# Patient Record
Sex: FEMALE | Race: WHITE
[De-identification: names, ages, dates, MRNs, and addresses within clinical notes are randomized per-mention and may not be internally consistent; named-entity substitution may affect disease eponyms.]

---

## 2017-06-04 ENCOUNTER — HOSPITAL ENCOUNTER (INPATIENT)
Dept: HOSPITAL 45 - C.EDB | Age: 82
LOS: 3 days | Discharge: NURSING, REHAB OR LONG-TERM CARE | DRG: 512 | End: 2017-06-07
Attending: HOSPITALIST | Admitting: HOSPITALIST
Payer: COMMERCIAL

## 2017-06-04 VITALS
HEART RATE: 79 BPM | SYSTOLIC BLOOD PRESSURE: 157 MMHG | DIASTOLIC BLOOD PRESSURE: 76 MMHG | TEMPERATURE: 98.96 F | OXYGEN SATURATION: 98 %

## 2017-06-04 VITALS
BODY MASS INDEX: 17.04 KG/M2 | HEIGHT: 62 IN | WEIGHT: 92.59 LBS | WEIGHT: 92.59 LBS | HEIGHT: 62 IN | BODY MASS INDEX: 17.04 KG/M2

## 2017-06-04 VITALS
HEART RATE: 82 BPM | TEMPERATURE: 98.96 F | OXYGEN SATURATION: 96 % | DIASTOLIC BLOOD PRESSURE: 66 MMHG | SYSTOLIC BLOOD PRESSURE: 116 MMHG

## 2017-06-04 VITALS
DIASTOLIC BLOOD PRESSURE: 67 MMHG | HEART RATE: 71 BPM | SYSTOLIC BLOOD PRESSURE: 118 MMHG | OXYGEN SATURATION: 97 % | TEMPERATURE: 98.78 F

## 2017-06-04 VITALS — OXYGEN SATURATION: 98 %
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
## 2017-06-04 DIAGNOSIS — Z88.0: ICD-10-CM

## 2017-06-04 DIAGNOSIS — M84.48XA: ICD-10-CM

## 2017-06-04 DIAGNOSIS — M80.032A: Primary | ICD-10-CM

## 2017-06-04 DIAGNOSIS — Z96.641: ICD-10-CM

## 2017-06-04 DIAGNOSIS — I10: ICD-10-CM
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
## 2017-06-04 DIAGNOSIS — M19.90: ICD-10-CM

## 2017-06-04 DIAGNOSIS — G40.909: ICD-10-CM

## 2017-06-04 DIAGNOSIS — Z91.040: ICD-10-CM

## 2017-06-04 LAB
ANION GAP SERPL CALC-SCNC: 7 MMOL/L (ref 3–11)
APPEARANCE UR: (no result)
BASOPHILS # BLD: 0.02 K/UL (ref 0–0.2)
BASOPHILS NFR BLD: 0.2 %
BILIRUB UR-MCNC: (no result) MG/DL
BUN SERPL-MCNC: 13 MG/DL (ref 7–18)
BUN/CREAT SERPL: 19.3 (ref 10–20)
CALCIUM SERPL-MCNC: 8.8 MG/DL (ref 8.5–10.1)
CHLORIDE SERPL-SCNC: 99 MMOL/L (ref 98–107)
CO2 SERPL-SCNC: 29 MMOL/L (ref 21–32)
COLOR UR: YELLOW
COMPLETE: YES
CREAT CL PREDICTED SERPL C-G-VRATE: 39.6 ML/MIN
CREAT SERPL-MCNC: 0.69 MG/DL (ref 0.6–1.2)
EOSINOPHIL NFR BLD AUTO: 217 K/UL (ref 130–400)
GLUCOSE SERPL-MCNC: 108 MG/DL (ref 70–99)
HCT VFR BLD CALC: 36 % (ref 37–47)
IG%: 0.4 %
IMM GRANULOCYTES NFR BLD AUTO: 7.5 %
INR PPP: 1 (ref 0.9–1.1)
LYMPHOCYTES # BLD: 0.76 K/UL (ref 1.2–3.4)
MANUAL MICROSCOPIC REQUIRED?: NO
MCH RBC QN AUTO: 31.5 PG (ref 25–34)
MCHC RBC AUTO-ENTMCNC: 33.1 G/DL (ref 32–36)
MCV RBC AUTO: 95.2 FL (ref 80–100)
MONOCYTES NFR BLD: 9.7 %
NEUTROPHILS # BLD AUTO: 0.3 %
NEUTROPHILS NFR BLD AUTO: 81.9 %
NITRITE UR QL STRIP: (no result)
PH UR STRIP: 8 [PH] (ref 4.5–7.5)
PMV BLD AUTO: 8.1 FL (ref 7.4–10.4)
POTASSIUM SERPL-SCNC: 4.7 MMOL/L (ref 3.5–5.1)
PROTHROMBIN TIME: 10.7 SECONDS (ref 9–12)
RBC # BLD AUTO: 3.78 M/UL (ref 4.2–5.4)
REVIEW REQ?: NO
SODIUM SERPL-SCNC: 135 MMOL/L (ref 136–145)
SP GR UR STRIP: 1.02 (ref 1–1.03)
URINE BILL WITH OR WITHOUT MIC: (no result)
URINE EPITHELIAL CELL AUTO: >30 /LPF (ref 0–5)
UROBILINOGEN UR-MCNC: (no result) MG/DL
WBC # BLD AUTO: 10.07 K/UL (ref 4.8–10.8)

## 2017-06-04 RX ADMIN — MORPHINE SULFATE PRN MG: 2 INJECTION, SOLUTION INTRAMUSCULAR; INTRAVENOUS at 23:56

## 2017-06-04 RX ADMIN — HEPARIN SODIUM SCH UNIT: 10000 INJECTION, SOLUTION INTRAVENOUS; SUBCUTANEOUS at 21:04

## 2017-06-04 RX ADMIN — MORPHINE SULFATE PRN MG: 2 INJECTION, SOLUTION INTRAMUSCULAR; INTRAVENOUS at 18:40

## 2017-06-04 RX ADMIN — Medication SCH TAB: at 21:01

## 2017-06-04 RX ADMIN — Medication SCH MG: at 21:01

## 2017-06-04 RX ADMIN — CARBAMAZEPINE SCH MG: 200 TABLET, EXTENDED RELEASE ORAL at 21:02

## 2017-06-04 NOTE — EMERGENCY ROOM VISIT NOTE
ED Visit Note


First contact with patient:  12:51


Staff note:


I have reviewed the Patients chart and have discussed this case with my PA. I 

generally agree with the ED note and findings.

## 2017-06-04 NOTE — HISTORY AND PHYSICAL
History & Physical


Date & Time of Service:


Jun 4, 2017 at 16:56


Chief Complaint:


Fall/Elbow Deformity


Primary Care Physician:


Anthony Mcclain M.D.


History of Present Illness


Source:  patient, caregiver, clinic records, hospital records


86 yo F with OA presents with L elbow pain and swelling after rolling off of 

her couch onto her floor.  Workup in the ER reveals a L exbow fracture and 

several L rib fractures.  She reports some pain in those areas, but states that 

it is controlled.  She has had surgeries in the past with no problems 

tolerating anesthesia.  She denies a history of blood clot.  She denies any 

history of chest pain or shortness of breath in the past 6 months and denies 

any history of heart or lung disease.  At baseline, she is very functional as 

she lives alone.  She states she takes care of everything for herself and can 

climb a flight of steps without any issues.  She does has a h/o R hip 

replacement and reports some chronic pain in that joint for which she takes 

Fentanyl successfully.  She reports h/o epilepsy but denies any h/o seizure in 

years.  Dr Cormier was contacted by the ER staff and is aware of the patient.  

He asked for Medicine team to admit and he will see the patient in consult.





Past Medical/Surgical History


Medical Problems:


(1) HTN (hypertension)


Status: Chronic  





(2) Osteoarthritis


Status: Chronic  





(3) Osteoporosis


Status: Chronic  





(4) Seizure disorder


Status: Chronic  





Surgical Problems:


(1) Status post hip replacement


Status: Chronic  





(2) Status post lumbar laminectomy


Status: Chronic  





(3) Status post tonsillectomy


Status: Chronic  














Family History





Hypertension





Social History


Smoking Status:  Never Smoker


Smokeless Tobacco Use:  No


Alcohol Use:  none


Drug Use:  none


Marital Status:  


Housing status:  lives alone


Occupational Status:  retired





Immunizations


History of Influenza Vaccine:  No


Influenza Vaccine Date:  Oct 2, 2014


History of Tetanus Vaccine?:  Yes


Tetanus Immunization Date:  Dec 17, 2012


History of Pneumococcal:  Yes


Pneumococcal Date:  Nov 19, 2015


History of Hepatitis B Vaccine:  No





Multi-Drug Resistant Organisms


History of MDRO:  No





Allergies


Coded Allergies:  


     ACE Inhibitors (Verified  Allergy, Intermediate, COUGH, 6/4/17)


     Adhesives (Verified  Allergy, Intermediate, SEVERE BLISTERS, 6/4/17)


     Latex1 -Allergic Contact Dermititis (Verified  Allergy, Mild, RASH, 

BLISTERS, 6/4/17)


     Alendronate (Verified  Allergy, Unknown, Unknown., 6/4/17)


 PT list.


     Penicillins (Verified  Allergy, Unknown, RASH, 6/4/17)


     Ranitidine (Verified  Allergy, Unknown, Unknown., 6/4/17)


 PT list.


     Tetracycline (Verified  Allergy, Unknown, UNKNOWN, 6/4/17)


     Topiramate (Verified  Allergy, Unknown, UNKNOWN, 6/4/17)


     Valproic Acid and Related (Verified  Adverse Reaction, Intermediate, 

DIPLOPIA;SEVERE WT LOSS, 6/4/17)


     Beta Adrenergic Blockers (Verified  Adverse Reaction, Mild, FATIGUE, 6/4/17

)


     Codeine (Verified  Adverse Reaction, Mild, NAUSEA, 6/4/17)


     Nifedipine (Verified  Adverse Reaction, Mild, ABD BLOATING, 6/4/17)





Home Medications


Scheduled


Calcium Carbonate-Vitamin D (Calcium 600 + D), 1 TAB PO BID


Carbamazepine (Carbatrol Er), 200 MG PO BID


Ergocalciferol (Drisdol), 1 CAP PO MONTHLY


Fentanyl (Fentanyl), 12 MCG TOP Q3D


Losartan Potassium (Cozaar), 1 TAB PO DAILY


Multiple Vitamins W/ Minerals (Preservision Areds), 2 CAP PO DAILY


Potassium Chloride (K-Tabs), 10 MEQ PO DAILY


Tolterodine Tartrate (Tolterodine Tartrate), 2 MG PO BID


Zonisamide (Zonegran), 100 MG PO BID





Scheduled PRN


Hydrocodone/Acetaminophen (Norco 10/325 Tab), 1 TAB PO Q4H PRN for Pain





Review of Systems


Ten systems were reviewed and negative except as indicated in HPI.





Physical Exam


Vital Signs











  Date Time  Temp Pulse Resp B/P (MAP) Pulse Ox O2 Delivery O2 Flow Rate FiO2


 


6/4/17 16:13     98 Room Air  


 


6/4/17 14:52  85 18 156/93 98 Room Air  


 


6/4/17 13:47  121      


 


6/4/17 12:57 36.9 85 18 163/73 95 Room Air  








GEN: elderly, frail, in no acute distress, alert and appropriate


HEENT: NC/AT, PERRL, normal sclerae, MMM, pharynx non-acute, no LAD


CARDIO: reg rate, S1/2 heard without m/g/r


LUNGS: CTA bilaterally, no crackles, rales or wheezes, good diaphragmatic 

excursion


ABD: soft, non-tender, non-distended, no rebound or guarding, +BS


EXTREMITY: 2+ pulses throughout, L elbow is swollen and erythematous-localized 

over olecranon process, cannot fully extend, sensation intact in distal 

extremity, moves all fingers without issue. 


NEURO: CN 2-12 grossly intact, sensation intact throughout


MUSC: limited movement of L arm as above, some difficulty moving R leg because 

of R hip pain, otherwise strength appears 5/5 throughout. 


SKIN:  warm and dry and changes as above.





Diagnostics


Laboratory Results





Results Past 24 Hours








Test


  6/4/17


13:55 6/4/17


16:18 Range/Units


 


 


White Blood Count 10.07  4.8-10.8  K/uL


 


Red Blood Count 3.78  4.2-5.4  M/uL


 


Hemoglobin 11.9  12.0-16.0  g/dL


 


Hematocrit 36.0  37-47  %


 


Mean Corpuscular Volume 95.2    fL


 


Mean Corpuscular Hemoglobin 31.5  25-34  pg


 


Mean Corpuscular Hemoglobin


Concent 33.1


  


  32-36  g/dl


 


 


Platelet Count 217  130-400  K/uL


 


Mean Platelet Volume 8.1  7.4-10.4  fL


 


Neutrophils (%) (Auto) 81.9   %


 


Lymphocytes (%) (Auto) 7.5   %


 


Monocytes (%) (Auto) 9.7   %


 


Eosinophils (%) (Auto) 0.3   %


 


Basophils (%) (Auto) 0.2   %


 


Neutrophils # (Auto) 8.24  1.4-6.5  K/uL


 


Lymphocytes # (Auto) 0.76  1.2-3.4  K/uL


 


Monocytes # (Auto) 0.98  0.11-0.59  K/uL


 


Eosinophils # (Auto) 0.03  0-0.5  K/uL


 


Basophils # (Auto) 0.02  0-0.2  K/uL


 


RDW Standard Deviation 46.8  36.4-46.3  fL


 


RDW Coefficient of Variation 13.4  11.5-14.5  %


 


Immature Granulocyte % (Auto) 0.4   %


 


Immature Granulocyte # (Auto) 0.04  0.00-0.02  K/uL


 


Sodium Level 135  136-145  mmol/L


 


Potassium Level 4.7  3.5-5.1  mmol/L


 


Chloride Level 99    mmol/L


 


Carbon Dioxide Level 29  21-32  mmol/L


 


Anion Gap 7.0  3-11  mmol/L


 


Blood Urea Nitrogen 13  7-18  mg/dl


 


Creatinine


  0.69


  


  0.60-1.20


mg/dl


 


Est Creatinine Clear Calc


Drug Dose 39.6


  


   ml/min


 


 


Estimated GFR (


American) 92.0


  


   


 


 


Estimated GFR (Non-


American 79.4


  


   


 


 


BUN/Creatinine Ratio 19.3  10-20  


 


Random Glucose 108  70-99  mg/dl


 


Calcium Level 8.8  8.5-10.1  mg/dl











Diagnostic Radiology


LEFT ELBOW 2 VIEWS





CLINICAL HISTORY: Fall. Elbow deformity.





FINDINGS: Frontal and lateral views of the left elbow are obtained. No prior


studies are available for comparison at the time of dictation. The skeletal


structures are osteopenic. There is a distracted fracture through the olecranon


process of the ulna. The fragments are distracted by at least 7 mm. There is a


large associated joint effusion and marked soft tissue edema around the elbow.


No additional fracture is seen. Enthesophytes arise from the humeral


epicondyles. Degenerative spurring is seen along the medial aspect of the joint


space.





IMPRESSION:





1. There is a distracted fracture of the olecranon process with associated joint


effusion and soft tissue edema.





2. No additional fracture is seen.


--------------------------------------------------------------------------------

-------------------------


RIGHT HIP 2 VIEWS





CLINICAL HISTORY: Fall with right hip pain.





FINDINGS: AP and frog-leg views of the right hip are correlated with pelvic


radiograph dated 11/10/2011. The skeletal structures are osteopenic. A right hip


arthroplasty is in near-anatomic alignment. No acute fracture is seen. No


periprosthetic lucency is identified. Sclerotic change is noted in the right


sacroiliac joint. The overlying soft tissues are within normal limits. Pelvic


calcifications are unchanged and may be related to uterine fibroids.





IMPRESSION:





1. There is no radiographic evidence of right hip fracture.





2. A right hip arthroplasty is in near-anatomic alignment.


--------------------------------------------------------------------------------

-----------


AP CHEST WITH LEFT-SIDED RIB SERIES





CLINICAL HISTORY:  Fall. Left rib pain.





FINDINGS: An AP upright chest radiograph with 3 additional views may left-sided


rib series is compared to study dated 10/5/2014. The heart is top normal for


projection and there is atherosclerotic calcification of the thoracic aorta.


Chronic interstitial thickening is unchanged. No airspace consolidation, pleural


effusion, or pneumothorax is seen. The skeletal structures are osteopenic. There


are numerous healed left-sided rib fractures. There are acute and nondistracted


left anterolateral 7th through 10th rib fractures seen on the rib series.


Degenerative change and moderate scoliosis is noted in the thoracic spine.


Fusion hardware is partially imaged in the lumbar spine.





IMPRESSION:





1. The lungs are clear and there is no pneumothorax.





2.There are acute and nondistracted left anterolateral 7th through 10th rib


fractures seen on the rib series.





EKG


SR 69.





Impression


Assessment and Plan


86 yo F with osteoporosis and poss UTI presents with L elbow fracture and 

multiple L rib fractures after traumatic fall from a sitting height 





1.  Fractures-Ortho to see and consider surgery on elbow.  She is OK to proceed 

to surgery without further cardiac workup at this time.  ER staff reports they 

will splint the elbow which has not been done at the time of this exam.  Will 

also allow ice pack to area and control pain with morphine and Norco PRN.  


2.  Epilepsy-remote h/o seizures, controlled well on current meds


3.  HTN-cont Losartan


4.  Osteoporosis


5.  Urge incontinence-hold.  Detrol to avoid unnecessary medication 

interactions and in setting of poss UTI


6.  UTI-chronic incontinence issues.  In setting of recent fall, will treat 

empirically with Rocephin.


7.  OA





DVT proph-heparin SQ


DNR-discussed with she and her guardian at admission


Dispo-to med/surge, pending Ortho evaluation and plans.





Mary Mullins DO


[facility]





Level of Care


Med/Surg





Advanced Directives


Existing Living Will:  Yes


Existing Power of :  Yes ([employer] NEIGHBOR )





Resuscitation Status


DO NOT RESUSCITATE





VTE Prophylaxis


VTE Risk Assessment Done? Y/N:  Yes


Risk Level:  Moderate


Given or contraindicated:  Unfractionated heparin SQ

## 2017-06-04 NOTE — DIAGNOSTIC IMAGING REPORT
AP CHEST WITH LEFT-SIDED RIB SERIES



CLINICAL HISTORY:  Fall. Left rib pain.



FINDINGS: An AP upright chest radiograph with 3 additional views may left-sided

rib series is compared to study dated 10/5/2014. The heart is top normal for

projection and there is atherosclerotic calcification of the thoracic aorta.

Chronic interstitial thickening is unchanged. No airspace consolidation, pleural

effusion, or pneumothorax is seen. The skeletal structures are osteopenic. There

are numerous healed left-sided rib fractures. There are acute and nondistracted

left anterolateral 7th through 10th rib fractures seen on the rib series.

Degenerative change and moderate scoliosis is noted in the thoracic spine.

Fusion hardware is partially imaged in the lumbar spine.



IMPRESSION:



1. The lungs are clear and there is no pneumothorax.



2.There are acute and nondistracted left anterolateral 7th through 10th rib

fractures seen on the rib series.







Electronically signed by:  Fredi Conway M.D.

6/4/2017 3:27 PM



Dictated Date/Time:  6/4/2017 3:24 PM

## 2017-06-04 NOTE — DIAGNOSTIC IMAGING REPORT
LEFT ELBOW 2 VIEWS



CLINICAL HISTORY: Fall. Elbow deformity.



FINDINGS: Frontal and lateral views of the left elbow are obtained. No prior

studies are available for comparison at the time of dictation. The skeletal

structures are osteopenic. There is a distracted fracture through the olecranon

process of the ulna. The fragments are distracted by at least 7 mm. There is a

large associated joint effusion and marked soft tissue edema around the elbow.

No additional fracture is seen. Enthesophytes arise from the humeral

epicondyles. Degenerative spurring is seen along the medial aspect of the joint

space.



IMPRESSION:



1. There is a distracted fracture of the olecranon process with associated joint

effusion and soft tissue edema.



2. No additional fracture is seen.







Electronically signed by:  Fredi Conway M.D.

6/4/2017 3:09 PM



Dictated Date/Time:  6/4/2017 3:07 PM

## 2017-06-04 NOTE — DIAGNOSTIC IMAGING REPORT
RIGHT HIP 2 VIEWS



CLINICAL HISTORY: Fall with right hip pain.



FINDINGS: AP and frog-leg views of the right hip are correlated with pelvic

radiograph dated 11/10/2011. The skeletal structures are osteopenic. A right hip

arthroplasty is in near-anatomic alignment. No acute fracture is seen. No

periprosthetic lucency is identified. Sclerotic change is noted in the right

sacroiliac joint. The overlying soft tissues are within normal limits. Pelvic

calcifications are unchanged and may be related to uterine fibroids.



IMPRESSION:



1. There is no radiographic evidence of right hip fracture.



2. A right hip arthroplasty is in near-anatomic alignment.







Electronically signed by:  Fredi Conway M.D.

6/4/2017 3:22 PM



Dictated Date/Time:  6/4/2017 3:21 PM

## 2017-06-05 VITALS
DIASTOLIC BLOOD PRESSURE: 81 MMHG | SYSTOLIC BLOOD PRESSURE: 174 MMHG | TEMPERATURE: 98.24 F | OXYGEN SATURATION: 96 % | HEART RATE: 83 BPM

## 2017-06-05 VITALS
OXYGEN SATURATION: 98 % | SYSTOLIC BLOOD PRESSURE: 132 MMHG | HEART RATE: 70 BPM | DIASTOLIC BLOOD PRESSURE: 68 MMHG | TEMPERATURE: 97.7 F

## 2017-06-05 VITALS
DIASTOLIC BLOOD PRESSURE: 66 MMHG | OXYGEN SATURATION: 97 % | TEMPERATURE: 98.06 F | HEART RATE: 63 BPM | SYSTOLIC BLOOD PRESSURE: 125 MMHG

## 2017-06-05 VITALS
DIASTOLIC BLOOD PRESSURE: 73 MMHG | SYSTOLIC BLOOD PRESSURE: 156 MMHG | HEART RATE: 63 BPM | OXYGEN SATURATION: 97 % | TEMPERATURE: 97.7 F

## 2017-06-05 VITALS
OXYGEN SATURATION: 95 % | DIASTOLIC BLOOD PRESSURE: 78 MMHG | SYSTOLIC BLOOD PRESSURE: 165 MMHG | TEMPERATURE: 98.06 F | HEART RATE: 81 BPM

## 2017-06-05 VITALS
TEMPERATURE: 97.88 F | HEART RATE: 86 BPM | SYSTOLIC BLOOD PRESSURE: 114 MMHG | OXYGEN SATURATION: 97 % | DIASTOLIC BLOOD PRESSURE: 67 MMHG

## 2017-06-05 LAB
ANION GAP SERPL CALC-SCNC: 9 MMOL/L (ref 3–11)
BUN SERPL-MCNC: 17 MG/DL (ref 7–18)
BUN/CREAT SERPL: 22.9 (ref 10–20)
CALCIUM SERPL-MCNC: 8.9 MG/DL (ref 8.5–10.1)
CHLORIDE SERPL-SCNC: 100 MMOL/L (ref 98–107)
CO2 SERPL-SCNC: 27 MMOL/L (ref 21–32)
CREAT CL PREDICTED SERPL C-G-VRATE: 36.9 ML/MIN
CREAT SERPL-MCNC: 0.74 MG/DL (ref 0.6–1.2)
EOSINOPHIL NFR BLD AUTO: 202 K/UL (ref 130–400)
GLUCOSE SERPL-MCNC: 95 MG/DL (ref 70–99)
HCT VFR BLD CALC: 30.3 % (ref 37–47)
MCH RBC QN AUTO: 31.6 PG (ref 25–34)
MCHC RBC AUTO-ENTMCNC: 33.3 G/DL (ref 32–36)
MCV RBC AUTO: 94.7 FL (ref 80–100)
PMV BLD AUTO: 8.4 FL (ref 7.4–10.4)
POTASSIUM SERPL-SCNC: 4 MMOL/L (ref 3.5–5.1)
RBC # BLD AUTO: 3.2 M/UL (ref 4.2–5.4)
SODIUM SERPL-SCNC: 136 MMOL/L (ref 136–145)
WBC # BLD AUTO: 7.17 K/UL (ref 4.8–10.8)

## 2017-06-05 PROCEDURE — 0PSL04Z REPOSITION LEFT ULNA WITH INTERNAL FIXATION DEVICE, OPEN APPROACH: ICD-10-PCS

## 2017-06-05 RX ADMIN — LOSARTAN POTASSIUM SCH MG: 50 TABLET, FILM COATED ORAL at 09:34

## 2017-06-05 RX ADMIN — CARBAMAZEPINE SCH MG: 200 TABLET, EXTENDED RELEASE ORAL at 21:33

## 2017-06-05 RX ADMIN — CARBAMAZEPINE SCH MG: 200 TABLET, EXTENDED RELEASE ORAL at 09:35

## 2017-06-05 RX ADMIN — Medication SCH TAB: at 09:35

## 2017-06-05 RX ADMIN — ONDANSETRON PRN MG: 2 INJECTION INTRAMUSCULAR; INTRAVENOUS at 20:06

## 2017-06-05 RX ADMIN — MORPHINE SULFATE PRN MG: 2 INJECTION, SOLUTION INTRAMUSCULAR; INTRAVENOUS at 03:57

## 2017-06-05 RX ADMIN — MORPHINE SULFATE PRN MG: 2 INJECTION, SOLUTION INTRAMUSCULAR; INTRAVENOUS at 09:30

## 2017-06-05 RX ADMIN — Medication SCH MG: at 09:36

## 2017-06-05 RX ADMIN — POTASSIUM CHLORIDE SCH MEQ: 750 TABLET, EXTENDED RELEASE ORAL at 09:36

## 2017-06-05 RX ADMIN — ONDANSETRON PRN MG: 2 INJECTION INTRAMUSCULAR; INTRAVENOUS at 09:33

## 2017-06-05 RX ADMIN — ACETAMINOPHEN PRN MG: 325 TABLET ORAL at 01:52

## 2017-06-05 RX ADMIN — MORPHINE SULFATE PRN MG: 2 INJECTION, SOLUTION INTRAMUSCULAR; INTRAVENOUS at 14:08

## 2017-06-05 RX ADMIN — Medication SCH MG: at 21:33

## 2017-06-05 RX ADMIN — Medication SCH TAB: at 09:30

## 2017-06-05 RX ADMIN — HEPARIN SODIUM SCH UNIT: 10000 INJECTION, SOLUTION INTRAVENOUS; SUBCUTANEOUS at 05:30

## 2017-06-05 RX ADMIN — ACETAMINOPHEN PRN MG: 325 TABLET ORAL at 07:47

## 2017-06-05 RX ADMIN — Medication SCH TAB: at 21:33

## 2017-06-05 NOTE — [facility] POST OPERATIVE BRIEF NOTE
Immediate Operative Summary


Operative Date


Jun 5, 2017.





Pre-Operative Diagnosis





Displaced intraarticular left olecranon fracture





Post-Operative Diagnosis





Displaced intraarticular left olecranon fracture





Procedure(s) Performed





Left open reduction internal fixation of olecranon fracture





Surgeon


Dr. Cormier





Assistant Surgeon(s)


MARY Lakhani PA-C





Estimated Blood Loss


15ml





Findings


See Dict





Specimens





none





Drains


None





Anesthesia


GLMA w/ local





Complication(s)


None





Disposition


Recovery Room / PACU

## 2017-06-05 NOTE — DIAGNOSTIC IMAGING REPORT
LEFT ELBOW 2 VIEWS



CLINICAL HISTORY: post-op fracture



COMPARISON: Images 6/4/2017



DISCUSSION: Patient is now casting material. Poor study technically as the

patient could not cooperate. Anatomic alignment status post pinning of the

proximal ulna. There is no evidence for soft tissue swelling.



IMPRESSION: Limited study due to patient's inability to cooperate. Evidence for

pinning of the proximal ulna.







Electronically signed by:  Anthony Calderón M.D.

6/5/2017 7:38 PM



Dictated Date/Time:  6/5/2017 7:36 PM

## 2017-06-05 NOTE — OPERATIVE REPORT
DATE OF OPERATION:  06/05/2017

 

PREOPERATIVE DIAGNOSIS:  Left displaced intra-articular olecranon fracture.

 

POSTOPERATIVE DIAGNOSES:  Same.

 

PROCEDURE PERFORMED:  Open reduction and internal fixation left displaced

intra-articular olecranon fracture with application of tension band wire.

 

SURGEON:  Dr. oCrmier.

 

FIRST ASSISTANT:  Agustín Lakhani PA-C who was present for patient

positioning, sterile prep and drape, management of retractors and

instruments.  He was present through the critical portions of the case

including wound closure, application of sterile dressing and transport of the

patient to recovery.

 

ANESTHESIA:  General LMA with local.

 

SPECIMENS:  None.

 

DRAINS:  None.

 

COMPLICATIONS:  None.

 

BLOOD LOSS:  15 mL.

 

PERTINENT HISTORY:  This is an 85-year-old female who sustained a fall on to

her left elbow suffering a left displaced intra-articular olecranon fracture.

 She was seen in the Emergency Department and admitted to the hospital for

further care and management as well as management of ambulatory dysfunction

and rib fractures as well as intractable pain.  The patient was then

evaluated and cleared for surgery and then scheduled for surgery as

indicated.

 

All potential risks, benefits, complications, alternatives, rehab, potential

for incomplete relief of symptoms, need for further surgery, DVT, PE, death,

persistent pain, swelling, scarring, weakness, neurovascular injury, wound

complications, hardware breakage, bone fracture, nonunion, malunion were

discussed with the patient.  The patient decided to proceed with the

procedure as indicated.

 

DESCRIPTION OF PROCEDURE:  The patient was taken to the operative suite,

placed supine on the operating room table.  I reviewed the consent and

identification of proper operative site, the patient was anesthetized, LMA

was placed.  Tourniquet was placed high on the left upper extremity over cast

padding.  Left upper extremity was then sterilely prepped and draped in usual

fashion, elevated, and exsanguinated with an Esmarch bandage.  Tourniquet

inflated to 250 mmHg.  Next, a 15-blade scalpel was used to make an incision

in the midline of the olecranon extending distally over the ulnar border of

the ulna.  The incision was then carefully deepened through the subcutaneous

tissue.  Meticulous hemostasis was achieved with electrocautery and there was

noted to be significant hematoma from the fall and fracture localized around

the area of the olecranon.  After this was suctioned and irrigated until

clear, the fracture fragments were then clearly identified.  The articular

surface was noted to be intact with displacement of the olecranon in an

intraarticular fracture pattern.  Next, the fracture was then carefully

reduced using a large Hsu reduction forceps and placed in two 0.062 inch K

wires from the olecranon crossing the fracture into the anterior aspect of

the ulna in an extraarticular pinning fashion.  This was performed under live

fluoroscopic assistance.  This was then followed by drilling of a 2.5 mm

drill hole in the more distal ulna for a tension band construct with an 18

gauge surgical wire, which was then passed through the intramedullary canal

of the ulna, passed through a sharp catheter placed adjacent to the olecranon

bone under the triceps insertion followed by tensioning of the tension band

with heavy needle .  After this was completed, excess wire was removed.

 The wire was then bent and placed in a soft tissue pouch adjacent to the

ulna on the opposite side to avoid any irritation of the ulnar nerve.  Next,

the 0.062 inch K wires were then bent, cut and then tamped into the bone and

soft tissue with a small bone tamp and mallet to avoid any hardware

irritation.  Next, the wound was copiously irrigated with sterile normal

saline.  Final x-rays obtained in AP and lateral projections followed by

closure of the fascia and bursa with 2-0 Vicryl.  The dermis was closed using

buried interrupted 3-0 Vicryl, the skin was then closed using nylon sutures. 

The incision was then infiltrated with 0.5% Marcaine plain, as well as the

fracture site for postop pain control.  Next, a gently sterile compressive

dressing was applied, overwrapped with a posterior plaster splint and Ace

wrap.  The elbow was held in neutral flexion and neutral rotation.  The

tourniquet was released.  The patient was awakened and taken to recovery in

stable condition.

 

 

I attest to the content of the Intraoperative Record and any orders documented therein. Any exception
s are noted below.

## 2017-06-05 NOTE — ORTHOPEDIC CONSULTATION REPORT
DATE OF CONSULTATION:  06/05/2017

 

REASON FOR CONSULT:  Left elbow fracture.

 

HISTORY OF PRESENT ILLNESS:  The patient is an 85-year-old white female who

resides by herself in [place] and states that she was sleeping on a couch

and was getting up and was still little sleepy and thought she was stepping

out of the couch where appeared she ended up just rolling off the couch and

injuring herself.  She had pain in her left elbow and also had some

discomfort in her left chest and was brought to the Emergency Room.  She had

a friend who had just happened to show up at the time of her fall, who

contacted the EMS and was brought to the Emergency Room here at [facility].  X-rays were taken and it was found that she had fractures of her

left anterolateral ribs 7 through 10 and also she had an olecranon fracture

that was distracted of the left elbow.  Hip x-rays showed no fractures and

she had a right hip replacement that is in alignment.  She was admitted by

the medicine service and we have now been asked to take care of her left

olecranon fracture.

 

PAST MEDICAL HISTORY:  Hypertension, osteoarthritis, osteoporosis, and

seizure disorder.

 

PAST SURGICAL HISTORY:  Right total hip replacement.  She has had a lumbar

laminectomy and tonsillectomy in the past.

 

FAMILY AND SOCIAL HISTORY:  As per admitting history and physical.

 

MEDICATIONS:  Calcium 600 plus D 1 tab p.o. b.i.d., carbamazepine 200 mg p.o.

b.i.d., Drisdol 1 cap p.o. monthly, fentanyl patch 12 mcg topically q. 3

days, losartan potassium 1 tab p.o. daily, multiple vitamin 2 caps p.o.

daily, potassium chloride 10 mEq p.o. daily, tolterodine tartrate 2 mg p.o.

b.i.d. and Zonegran 100 mg p.o. b.i.d., Norco 10/325 one tablet p.o. q. 4

hours p.r.n.

 

ALLERGIES:  ACE INHIBITORS, ADHESIVES, LATEX, ALENDRONATE, PENICILLINS,

ZANTAC, TETRACYCLINE, TOPIRAMATE, VALPROIC ACID, BETA ADRENERGIC BLOCKERS,

CODEINE CAUSING NAUSEA, NIFEDIPINE.

 

REVIEW OF SYSTEMS:  As per admitting history and physical.

 

PHYSICAL EXAMINATION:

EXTREMITIES:  On examination of the patient's left upper extremity, she has a

posterior splint applied to her left upper extremity and is in a sling.  She

has good range of motion of her left fingers and has good sensation.  Cap

refill is less than 2 seconds.  Left shoulder is nontender on palpation and

taking her through gentle range of motion of the left shoulder did not cause

any discomfort.  Right upper extremity is benign and not tender on palpation

and has good range of motion.  Lower extremities are nontender at the hips,

knees and ankles.  She has range of motion at this time without any

discomfort during her range of motion.

NECK:  She denies neck pain on palpation and has good motion of the neck and

denies any classic low back pain at this time or since the fall.  She has

some anterolateral chest pain of the left chest due to rib fracture.

NEUROLOGIC:  No gross motor or sensory deficits are noted at this time. 

Sensations intact in the fingers as noted above.

 

ASSESSMENT:

1.  Left displaced olecranon fracture.

2.  Fractured ribs 7 through 10, left anterolateral.

 

PLAN:  The patient was made n.p.o. this morning after she had eaten

breakfast.  Plans will be to take her to the operating room today after 4:00

by Dr. Cormier for ORIF of her left olecranon fracture.  I have discussed the

case with medicine service and she has been cleared for the operating room

for the procedure and we will plan for ORIF later this afternoon of the left

olecranon.

## 2017-06-05 NOTE — PROGRESS NOTE
Internal Med Progress Note


Date of Service:


Jun 5, 2017.


Provider Documentation:





SUBJECTIVE:





Patient is frustrated as she has not been in OR  yet.


C/o pain in left elbow. 


No chest pain, though has multiple rib fractures.


No recent illness, infection. No prior hx of MI, Stroke. No c/o chest pain, SOB

, cough, fever, chills.


Able to do activities of daily living independently








OBJECTIVE:





Vital Signs-as noted below





Exam:


General-AAOX3, no distress, malnourished, frail


Eyes-No icterus


Neck-Supple, No  JVD


Lungs-AEBE decreased, no wheezing, rales


Heart-S1, S2 normal, no murmurs


Abdomen-Soft, non tender, non distended, BS present


Extremities-No edema


Neuro-Grossly no focal deficits





Lab data as noted below.


ASSESSMENT & PLAN:





86 yo F with osteoporosis and poss UTI presents with L elbow fracture and 

multiple L rib fractures after traumatic fall from a sitting height.





LEFT OLECRANON FRACTURE


S/P MECHANICAL FALL 


-Pre operative exam: No symptoms/signs concerning for cardiac/pulmonary 

conditions. No prior hx of CKD, Stroke, MI. 


Functional status- Able to do ADLS independently, uses walker and cane. 


CXR- Rib fractures 7-10, left and no other acute abnormalities, EKG- NSR, no 

acute ischemic changes


Risk factors- HTN, Advanced age


-OK to proceed with surgery with moderate risk of cardio pulmonary complications


-Pain control


-Ortho on board- Plan for surgery in AM





LEFT RIB FRACTURES:


X ray- 7th-10th , left non displaced fractures


-No pain in chest





HX OF EPILEPSY


Remote hx


-well controlled on current medications





HTN


-Stable. Continue with losartan





OSTEOPOROSIS


Vitamin D level 41- normal


-Continue with calcium supplements





ABNORMAL UA


-No urinary symptoms


-Urine cx- contaminated


-Will discontinue IV Rocephin as no indication for it.





HX OF RIGHT HIP ARTHROPLASTY 





DVT proph-heparin SQ for now





DNR-discussed with she and her guardian at admission





DISPOSITION


Plan will be rehab post surgery as she lives alone


Vital Signs:











  Date Time  Temp Pulse Resp B/P (MAP) Pulse Ox O2 Delivery O2 Flow Rate FiO2


 


6/5/17 08:02 36.7 63 16 125/66 (85) 97 Room Air  


 


6/5/17 08:00      Room Air  


 


6/5/17 00:00      Room Air  


 


6/4/17 22:57 37.1 71 16 118/67 (84) 97 Room Air  


 


6/4/17 20:05 37.2 82 16 116/66 (83) 96 Room Air  


 


6/4/17 17:23 37.2 79 18 157/76 (103) 98 Room Air  


 


6/4/17 16:59  76 18 148/67 97 Room Air  


 


6/4/17 16:13     98 Room Air  


 


6/4/17 14:52  85 18 156/93 98 Room Air  


 


6/4/17 13:47  121      


 


6/4/17 12:57 36.9 85 18 163/73 95 Room Air  








Lab Results:





Results Past 24 Hours








Test


  6/4/17


13:55 6/4/17


16:58 6/4/17


18:40 6/5/17


05:49 Range/Units


 


 


White Blood Count 10.07   7.17 4.8-10.8  K/uL


 


Red Blood Count 3.78   3.20 4.2-5.4  M/uL


 


Hemoglobin 11.9   10.1 12.0-16.0  g/dL


 


Hematocrit 36.0   30.3 37-47  %


 


Mean Corpuscular Volume 95.2   94.7   fL


 


Mean Corpuscular Hemoglobin 31.5   31.6 25-34  pg


 


Mean Corpuscular Hemoglobin


Concent 33.1


  


  


  33.3


  32-36  g/dl


 


 


Platelet Count 217   202 130-400  K/uL


 


Mean Platelet Volume 8.1   8.4 7.4-10.4  fL


 


Neutrophils (%) (Auto) 81.9     %


 


Lymphocytes (%) (Auto) 7.5     %


 


Monocytes (%) (Auto) 9.7     %


 


Eosinophils (%) (Auto) 0.3     %


 


Basophils (%) (Auto) 0.2     %


 


Neutrophils # (Auto) 8.24    1.4-6.5  K/uL


 


Lymphocytes # (Auto) 0.76    1.2-3.4  K/uL


 


Monocytes # (Auto) 0.98    0.11-0.59  K/uL


 


Eosinophils # (Auto) 0.03    0-0.5  K/uL


 


Basophils # (Auto) 0.02    0-0.2  K/uL


 


RDW Standard Deviation 46.8   46.6 36.4-46.3  fL


 


RDW Coefficient of Variation 13.4   13.3 11.5-14.5  %


 


Immature Granulocyte % (Auto) 0.4     %


 


Immature Granulocyte # (Auto) 0.04    0.00-0.02  K/uL


 


Sodium Level 135   136 136-145  mmol/L


 


Potassium Level 4.7   4.0 3.5-5.1  mmol/L


 


Chloride Level 99   100   mmol/L


 


Carbon Dioxide Level 29   27 21-32  mmol/L


 


Anion Gap 7.0   9.0 3-11  mmol/L


 


Blood Urea Nitrogen 13   17 7-18  mg/dl


 


Creatinine


  0.69


  


  


  0.74


  0.60-1.20


mg/dl


 


Est Creatinine Clear Calc


Drug Dose 39.6


  


  


  36.9


   ml/min


 


 


Estimated GFR (


American) 92.0


  


  


  85.6


   


 


 


Estimated GFR (Non-


American 79.4


  


  


  73.9


   


 


 


BUN/Creatinine Ratio 19.3   22.9 10-20  


 


Random Glucose 108   95 70-99  mg/dl


 


Calcium Level 8.8   8.9 8.5-10.1  mg/dl


 


Prothrombin Time


  


  10.7


  


  


  9.0-12.0


SECONDS


 


Prothromb Time International


Ratio 


  1.0


  


  


  0.9-1.1  


 


 


Urine Color   YELLOW   


 


Urine Appearance   CLOUDY  CLEAR  


 


Urine pH   8.0  4.5-7.5  


 


Urine Specific Gravity   1.018  1.000-1.030  


 


Urine Protein   NEG  NEG  


 


Urine Glucose (UA)   NEG  NEG  


 


Urine Ketones   TRACE  NEG  


 


Urine Occult Blood   NEG  NEG  


 


Urine Nitrite   NEG  NEG  


 


Urine Bilirubin   NEG  NEG  


 


Urine Urobilinogen   NEG  NEG  


 


Urine Leukocyte Esterase   SMALL  NEG  


 


Urine WBC (Auto)   1-5  0-5  /hpf


 


Urine RBC (Auto)   5-10  0-4  /hpf


 


Urine Hyaline Casts (Auto)   1-5  0-5  /lpf


 


Urine Epithelial Cells (Auto)   >30  0-5  /lpf


 


Urine Bacteria (Auto)   1+  NEG  


 


25-Hydroxy Vitamin D Total    41.1   ng/ml








Microbiology Results


6/4/17 Urine Culture - Final, Complete


         GREATER THAN THREE TYPES OF ORGANISMS...

## 2017-06-06 VITALS
HEART RATE: 84 BPM | TEMPERATURE: 98.42 F | OXYGEN SATURATION: 97 % | SYSTOLIC BLOOD PRESSURE: 147 MMHG | DIASTOLIC BLOOD PRESSURE: 72 MMHG

## 2017-06-06 VITALS — DIASTOLIC BLOOD PRESSURE: 66 MMHG | TEMPERATURE: 98.96 F | SYSTOLIC BLOOD PRESSURE: 130 MMHG | OXYGEN SATURATION: 95 %

## 2017-06-06 VITALS
HEART RATE: 78 BPM | OXYGEN SATURATION: 97 % | DIASTOLIC BLOOD PRESSURE: 71 MMHG | SYSTOLIC BLOOD PRESSURE: 150 MMHG | TEMPERATURE: 100.04 F

## 2017-06-06 VITALS
OXYGEN SATURATION: 98 % | HEART RATE: 77 BPM | TEMPERATURE: 97.88 F | SYSTOLIC BLOOD PRESSURE: 124 MMHG | DIASTOLIC BLOOD PRESSURE: 61 MMHG

## 2017-06-06 VITALS
DIASTOLIC BLOOD PRESSURE: 56 MMHG | SYSTOLIC BLOOD PRESSURE: 130 MMHG | HEART RATE: 78 BPM | TEMPERATURE: 98.24 F | OXYGEN SATURATION: 97 %

## 2017-06-06 VITALS — OXYGEN SATURATION: 97 %

## 2017-06-06 LAB
ANION GAP SERPL CALC-SCNC: 8 MMOL/L (ref 3–11)
BUN SERPL-MCNC: 12 MG/DL (ref 7–18)
BUN/CREAT SERPL: 16.9 (ref 10–20)
CALCIUM SERPL-MCNC: 9.3 MG/DL (ref 8.5–10.1)
CHLORIDE SERPL-SCNC: 94 MMOL/L (ref 98–107)
CO2 SERPL-SCNC: 28 MMOL/L (ref 21–32)
CREAT CL PREDICTED SERPL C-G-VRATE: 37.9 ML/MIN
CREAT SERPL-MCNC: 0.72 MG/DL (ref 0.6–1.2)
EOSINOPHIL NFR BLD AUTO: 207 K/UL (ref 130–400)
GLUCOSE SERPL-MCNC: 107 MG/DL (ref 70–99)
HCT VFR BLD CALC: 29.4 % (ref 37–47)
MCH RBC QN AUTO: 31.1 PG (ref 25–34)
MCHC RBC AUTO-ENTMCNC: 33.3 G/DL (ref 32–36)
MCV RBC AUTO: 93.3 FL (ref 80–100)
PMV BLD AUTO: 8.2 FL (ref 7.4–10.4)
POTASSIUM SERPL-SCNC: 4 MMOL/L (ref 3.5–5.1)
RBC # BLD AUTO: 3.15 M/UL (ref 4.2–5.4)
SODIUM SERPL-SCNC: 130 MMOL/L (ref 136–145)
WBC # BLD AUTO: 8.57 K/UL (ref 4.8–10.8)

## 2017-06-06 RX ADMIN — Medication SCH TAB: at 20:31

## 2017-06-06 RX ADMIN — ONDANSETRON PRN MG: 2 INJECTION INTRAMUSCULAR; INTRAVENOUS at 20:30

## 2017-06-06 RX ADMIN — CARBAMAZEPINE SCH MG: 200 TABLET, EXTENDED RELEASE ORAL at 20:31

## 2017-06-06 RX ADMIN — OXYCODONE HYDROCHLORIDE PRN MG: 5 TABLET ORAL at 21:52

## 2017-06-06 RX ADMIN — LOSARTAN POTASSIUM SCH MG: 50 TABLET, FILM COATED ORAL at 08:32

## 2017-06-06 RX ADMIN — MORPHINE SULFATE PRN MG: 2 INJECTION, SOLUTION INTRAMUSCULAR; INTRAVENOUS at 15:24

## 2017-06-06 RX ADMIN — OXYCODONE HYDROCHLORIDE PRN MG: 5 TABLET ORAL at 08:44

## 2017-06-06 RX ADMIN — CEFAZOLIN SCH MLS/HR: 10 INJECTION, POWDER, FOR SOLUTION INTRAVENOUS at 10:58

## 2017-06-06 RX ADMIN — Medication SCH TAB: at 08:31

## 2017-06-06 RX ADMIN — POTASSIUM CHLORIDE SCH MEQ: 750 TABLET, EXTENDED RELEASE ORAL at 08:32

## 2017-06-06 RX ADMIN — MORPHINE SULFATE PRN MG: 2 INJECTION, SOLUTION INTRAMUSCULAR; INTRAVENOUS at 04:50

## 2017-06-06 RX ADMIN — Medication SCH MG: at 08:31

## 2017-06-06 RX ADMIN — Medication SCH TAB: at 08:32

## 2017-06-06 RX ADMIN — OXYCODONE HYDROCHLORIDE PRN MG: 5 TABLET ORAL at 16:58

## 2017-06-06 RX ADMIN — Medication SCH MG: at 20:30

## 2017-06-06 RX ADMIN — CARBAMAZEPINE SCH MG: 200 TABLET, EXTENDED RELEASE ORAL at 08:31

## 2017-06-06 RX ADMIN — MORPHINE SULFATE PRN MG: 2 INJECTION, SOLUTION INTRAMUSCULAR; INTRAVENOUS at 23:36

## 2017-06-06 RX ADMIN — CEFAZOLIN SCH MLS/HR: 10 INJECTION, POWDER, FOR SOLUTION INTRAVENOUS at 19:07

## 2017-06-06 RX ADMIN — MORPHINE SULFATE PRN MG: 2 INJECTION, SOLUTION INTRAMUSCULAR; INTRAVENOUS at 10:58

## 2017-06-06 RX ADMIN — CEFAZOLIN SCH MLS/HR: 10 INJECTION, POWDER, FOR SOLUTION INTRAVENOUS at 01:51

## 2017-06-06 RX ADMIN — ACETAMINOPHEN PRN MG: 325 TABLET ORAL at 21:51

## 2017-06-06 NOTE — ORTHOPEDIC PROGRESS NOTE
Orthopedic Progress Note


Date of Service


Jun 6, 2017.





Subjective


Post OP Day:  1


Reports: feeling well, complaints (PAIN IN ELBOW. NO PO MEDS ORDERED EXCEPT 

TYLENOL), Denies: chest pain, SOB, nausea / vomiting, light headedness, calf 

pain





Objective


N/V intact, splint C/D/I, dressing C/D/I, A&O x3, CMS intact


FINGERS MOBILE. MINIMAL SWELLING.











  Date Time  Temp Pulse Resp B/P (MAP) Pulse Ox O2 Delivery O2 Flow Rate FiO2


 


6/6/17 07:53     97   


 


6/6/17 07:48 36.8 78 20 130/56 (80) 97 Room Air  


 


6/6/17 03:59 36.6 77 16 124/61 (82) 98 Room Air  


 


6/6/17 00:05      Room Air  


 


6/5/17 22:49 36.6 86 18 114/67 (83) 97 Room Air  


 


6/5/17 21:50 36.5 70 18 132/68 (89) 98 Room Air  


 


6/5/17 20:20 36.7 81 12 165/78 (107) 95 Room Air  


 


6/5/17 19:50 36.8 83 16 174/81 (112) 96 Room Air  


 


6/5/17 19:50     96 Room Air  


 


6/5/17 19:30 36.5 89 18 158/80 95 Room Air  


 


6/5/17 19:15  86 18 173/85 94 Room Air  


 


6/5/17 19:05  90 18 179/88 99 Room Air  


 


6/5/17 18:55 36.9 110 18 186/91 96 Mask 10 


 


6/5/17 16:10 36.9 69 18 153/75 95 Room Air  


 


6/5/17 15:45      Room Air  


 


6/5/17 15:16 36.5 63 18 156/73 (100) 97 Room Air  








Laboratory Results 24 Hours:











Test


  6/6/17


04:50


 


Hematocrit 29.4 % 


 


Hemoglobin 9.8 g/dL 











Assessment & Plan


Assessment:


POD#1 SP ORIF LEFT ELBOW


Plan:


ORDERED PO ROXICODONE, CAN HAVE A DOSE NOW.








Inhouse Planning


Pain Management:  PO Tylenol, Oxy IR





Discharge Planning


Discharge Planning:  home

## 2017-06-06 NOTE — ORTHOPEDIC PROGRESS NOTE
Orthopedic Progress Note


Date of Service


Jun 6, 2017.





Subjective


Post OP Day:  1


Reports: feeling well, Denies: SOB, nausea / vomiting


Additional Notes:


Pain in the left elbow. States she recently asked for pain meds.





Objective


N/V intact, splint C/D/I, capillary refill less than 2 sec., dressing C/D/I, A&

O x3


Left hand fingers are mobile.


Sensation intact distally in the LUE.











  Date Time  Temp Pulse Resp B/P (MAP) Pulse Ox O2 Delivery O2 Flow Rate FiO2


 


6/6/17 15:04 37.2  16 130/66 (87) 95 Room Air  


 


6/6/17 12:07 37.8 78 24 150/71 (97) 97 Room Air  


 


6/6/17 08:30      Room Air  


 


6/6/17 07:53     97 Room Air  


 


6/6/17 07:48 36.8 78 20 130/56 (80) 97 Room Air  


 


6/6/17 03:59 36.6 77 16 124/61 (82) 98 Room Air  


 


6/6/17 00:05      Room Air  


 


6/5/17 22:49 36.6 86 18 114/67 (83) 97 Room Air  


 


6/5/17 21:50 36.5 70 18 132/68 (89) 98 Room Air  


 


6/5/17 20:20 36.7 81 12 165/78 (107) 95 Room Air  


 


6/5/17 19:50 36.8 83 16 174/81 (112) 96 Room Air  


 


6/5/17 19:50     96 Room Air  


 


6/5/17 19:30 36.5 89 18 158/80 95 Room Air  


 


6/5/17 19:15  86 18 173/85 94 Room Air  


 


6/5/17 19:05  90 18 179/88 99 Room Air  


 


6/5/17 18:55 36.9 110 18 186/91 96 Mask 10 


 


6/5/17 16:10 36.9 69 18 153/75 95 Room Air  


 


6/5/17 15:45      Room Air  








Laboratory Results 24 Hours:











Test


  6/6/17


04:50


 


Hematocrit 29.4 % 


 


Hemoglobin 9.8 g/dL 











Assessment & Plan


Assessment:


POD#1 SP ORIF LEFT Olecranon fx


Plan:


ORDERED PO ROXICODONE


Keep splint in place at all times.


Sling as needed 


F/U in 2 wks at Dr. Cormier's office.


Ortho will sign off at this time.








Inhouse Planning


Pain Management:  PO Tylenol, Oxy IR





Discharge Planning


Discharge Planning:  rehab hospital

## 2017-06-06 NOTE — CONSULTANT RECOMMENDATIONS
Consultant Recommendations


Date of Service


Jun 6, 2017.





Consultant Recommendations


ACTIVITY RECOMMENDATIONS:





*  Avoid lifting anything with the left upper extremity until your first post 

operative visit.








SPECIAL CARE INSTRUCTIONS:





*   Your bandage should be left in place until your follow up visit in 2 weeks.


*   Some drainage onto the dressing may occur.  This is normal.


*   If the bandage feels excessively tight, you may loosen the elastic


    bandage.  Then call the physician's office for further instructions.


*   If possible, keep your hand elevated above the level of your


    heart for the first 2 post operative days.  You may use a sling if 

necessary.


*   You should move your fingers regularly ( motions per hour)


    unless otherwise instructed.








SPECIAL PRECAUTIONS:





*  If you notice increased drainage, fever over 101 degrees F. or severe,


    unremitting pain, call your physician/office at [phone].


*  You may have been prescribed pain medication.  If you experience nausea


    and/or skin rash, discontinue this medication and contact our office for an 


    alternative medication.








FOLLOW UP VISIT:





If appointment is not already scheduled:





Please call [facility] to make a follow-up appointment with 

Dr. Cormier 


for 2 weeks after your surgery at [phone].

## 2017-06-06 NOTE — PROGRESS NOTE
Internal Med Progress Note


Date of Service:


Jun 6, 2017.


Provider Documentation:





SUBJECTIVE:





Seen and examined at bedside. States having LUE pain at surgical site. 


Denies chest pain, SOB. Offers no other complaints. 





OBJECTIVE:





Vital Signs-as noted below





Physical Exam:


General Appearance:Moderately built and nourished, no apparent distress


Head:  normocephalic, Atraumatic 


Eyes:  normal inspection, EOMI, PERRL


Neck:  supple, Trachea midline


Respiratory/Chest: Normal breath sounds, CTA


Cardiovascular: S1, S2, No murmur


Abdomen/GI:Soft, Non tender, Bowel sounds present


Extremities/Musculoskelatal:normal inspection, no edema, LUE in surgical 

bandage 


Neurologic/Psych:AAOX3, grossly no focal neurological deficits 


Skin:  normal color, warm





Lab data as noted below.


ASSESSMENT & PLAN:


Patient is an 85 yr F with osteoporosis presents with L elbow fracture and 

multiple L rib fractures after traumatic fall.





LEFT OLECRANON FRACTURE


S/P MECHANICAL FALL 


S/O L Olecranon ORIF POD #1 


CXR- Rib fractures 7-10, left and no other acute abnormalities


Pain control


Appreciate Orthopedics help 








Left Rib fractures:


X ray- 7th-10th , left non displaced fractures


Conservative management 








H/O Epilepsy 


Stable


Continue current medications








HTN


Stable


Continue losartan





OSTEOPOROSIS


Vitamin D level 41- normal


Continue with calcium supplements





ABNORMAL UA


No urinary symptoms


Urine cx- contaminated


S/P IV Rocephin: discontinued








H/O R hip Arthroplasty  








Chronic Hyponatremia:


Stable








DVT Px:


heparin SQ








Code Status:


DNR








DISPOSITION


Plan to DC to rehab when stable


, PT/OT consulted


Vital Signs:











  Date Time  Temp Pulse Resp B/P (MAP) Pulse Ox O2 Delivery O2 Flow Rate FiO2


 


6/6/17 12:07 37.8 78 24 150/71 (97) 97 Room Air  


 


6/6/17 08:30      Room Air  


 


6/6/17 07:53     97 Room Air  


 


6/6/17 07:48 36.8 78 20 130/56 (80) 97 Room Air  


 


6/6/17 03:59 36.6 77 16 124/61 (82) 98 Room Air  


 


6/6/17 00:05      Room Air  


 


6/5/17 22:49 36.6 86 18 114/67 (83) 97 Room Air  


 


6/5/17 21:50 36.5 70 18 132/68 (89) 98 Room Air  


 


6/5/17 20:20 36.7 81 12 165/78 (107) 95 Room Air  


 


6/5/17 19:50 36.8 83 16 174/81 (112) 96 Room Air  


 


6/5/17 19:50     96 Room Air  


 


6/5/17 19:30 36.5 89 18 158/80 95 Room Air  


 


6/5/17 19:15  86 18 173/85 94 Room Air  


 


6/5/17 19:05  90 18 179/88 99 Room Air  


 


6/5/17 18:55 36.9 110 18 186/91 96 Mask 10 


 


6/5/17 16:10 36.9 69 18 153/75 95 Room Air  


 


6/5/17 15:45      Room Air  


 


6/5/17 15:16 36.5 63 18 156/73 (100) 97 Room Air  








Lab Results:





Results Past 24 Hours








Test


  6/6/17


04:50 Range/Units


 


 


White Blood Count 8.57 4.8-10.8  K/uL


 


Red Blood Count 3.15 4.2-5.4  M/uL


 


Hemoglobin 9.8 12.0-16.0  g/dL


 


Hematocrit 29.4 37-47  %


 


Mean Corpuscular Volume 93.3   fL


 


Mean Corpuscular Hemoglobin 31.1 25-34  pg


 


Mean Corpuscular Hemoglobin


Concent 33.3


  32-36  g/dl


 


 


RDW Standard Deviation 44.3 36.4-46.3  fL


 


RDW Coefficient of Variation 13.1 11.5-14.5  %


 


Platelet Count 207 130-400  K/uL


 


Mean Platelet Volume 8.2 7.4-10.4  fL


 


Sodium Level 130 136-145  mmol/L


 


Potassium Level 4.0 3.5-5.1  mmol/L


 


Chloride Level 94   mmol/L


 


Carbon Dioxide Level 28 21-32  mmol/L


 


Anion Gap 8.0 3-11  mmol/L


 


Blood Urea Nitrogen 12 7-18  mg/dl


 


Creatinine


  0.72


  0.60-1.20


mg/dl


 


Est Creatinine Clear Calc


Drug Dose 37.9


   ml/min


 


 


Estimated GFR (


American) 88.5


   


 


 


Estimated GFR (Non-


American 76.4


   


 


 


BUN/Creatinine Ratio 16.9 10-20  


 


Random Glucose 107 70-99  mg/dl


 


Calcium Level 9.3 8.5-10.1  mg/dl

## 2017-06-06 NOTE — ANESTHESIOLOGY PROGRESS NOTE
Anesthesia Post Op Note


Date & Time


Jun 6, 2017 at 09:54





Vital Signs


Pain Intensity:  9.0





Vital Signs Past 12 Hours








  Date Time  Temp Pulse Resp B/P (MAP) Pulse Ox O2 Delivery O2 Flow Rate FiO2


 


6/6/17 07:53     97   


 


6/6/17 07:48 36.8 78 20 130/56 (80) 97 Room Air  


 


6/6/17 03:59 36.6 77 16 124/61 (82) 98 Room Air  


 


6/6/17 00:05      Room Air  


 


6/5/17 22:49 36.6 86 18 114/67 (83) 97 Room Air  











Notes


Mental Status:  alert / awake / arousable, participated in evaluation


Pt Amnestic to Procedure:  Yes


Nausea / Vomiting:  adequately controlled


Pain:  adequately controlled


Airway Patency, RR, SpO2:  stable & adequate


BP & HR:  stable & adequate


Hydration State:  stable & adequate


Anesthetic Complications:  no major complications apparent

## 2017-06-07 VITALS
SYSTOLIC BLOOD PRESSURE: 134 MMHG | HEART RATE: 90 BPM | TEMPERATURE: 98.42 F | OXYGEN SATURATION: 99 % | DIASTOLIC BLOOD PRESSURE: 64 MMHG

## 2017-06-07 VITALS — OXYGEN SATURATION: 97 %

## 2017-06-07 VITALS
HEART RATE: 90 BPM | TEMPERATURE: 98.42 F | DIASTOLIC BLOOD PRESSURE: 64 MMHG | SYSTOLIC BLOOD PRESSURE: 134 MMHG | OXYGEN SATURATION: 99 %

## 2017-06-07 VITALS
TEMPERATURE: 98.6 F | HEART RATE: 72 BPM | SYSTOLIC BLOOD PRESSURE: 134 MMHG | DIASTOLIC BLOOD PRESSURE: 66 MMHG | OXYGEN SATURATION: 97 %

## 2017-06-07 VITALS — SYSTOLIC BLOOD PRESSURE: 102 MMHG | DIASTOLIC BLOOD PRESSURE: 65 MMHG | HEART RATE: 90 BPM

## 2017-06-07 LAB
ANION GAP SERPL CALC-SCNC: 8 MMOL/L (ref 3–11)
BASOPHILS # BLD: 0.04 K/UL (ref 0–0.2)
BASOPHILS NFR BLD: 0.5 %
BUN SERPL-MCNC: 11 MG/DL (ref 7–18)
BUN/CREAT SERPL: 17.9 (ref 10–20)
CALCIUM SERPL-MCNC: 8.9 MG/DL (ref 8.5–10.1)
CHLORIDE SERPL-SCNC: 92 MMOL/L (ref 98–107)
CO2 SERPL-SCNC: 26 MMOL/L (ref 21–32)
COMPLETE: YES
CREAT CL PREDICTED SERPL C-G-VRATE: 44.7 ML/MIN
CREAT SERPL-MCNC: 0.61 MG/DL (ref 0.6–1.2)
EOSINOPHIL NFR BLD AUTO: 205 K/UL (ref 130–400)
GLUCOSE SERPL-MCNC: 92 MG/DL (ref 70–99)
HCT VFR BLD CALC: 27.4 % (ref 37–47)
IG%: 0.4 %
IMM GRANULOCYTES NFR BLD AUTO: 17.5 %
LYMPHOCYTES # BLD: 1.42 K/UL (ref 1.2–3.4)
MCH RBC QN AUTO: 31.4 PG (ref 25–34)
MCHC RBC AUTO-ENTMCNC: 33.9 G/DL (ref 32–36)
MCV RBC AUTO: 92.6 FL (ref 80–100)
MONOCYTES NFR BLD: 18.4 %
NEUTROPHILS # BLD AUTO: 1.8 %
NEUTROPHILS NFR BLD AUTO: 61.4 %
PMV BLD AUTO: 8.5 FL (ref 7.4–10.4)
POTASSIUM SERPL-SCNC: 3.9 MMOL/L (ref 3.5–5.1)
RBC # BLD AUTO: 2.96 M/UL (ref 4.2–5.4)
SODIUM SERPL-SCNC: 126 MMOL/L (ref 136–145)
WBC # BLD AUTO: 8.11 K/UL (ref 4.8–10.8)

## 2017-06-07 RX ADMIN — Medication SCH MG: at 09:49

## 2017-06-07 RX ADMIN — LOSARTAN POTASSIUM SCH MG: 50 TABLET, FILM COATED ORAL at 09:49

## 2017-06-07 RX ADMIN — Medication SCH TAB: at 09:49

## 2017-06-07 RX ADMIN — OXYCODONE HYDROCHLORIDE PRN MG: 5 TABLET ORAL at 11:30

## 2017-06-07 RX ADMIN — CARBAMAZEPINE SCH MG: 200 TABLET, EXTENDED RELEASE ORAL at 09:50

## 2017-06-07 RX ADMIN — Medication SCH TAB: at 09:50

## 2017-06-07 RX ADMIN — POTASSIUM CHLORIDE SCH MEQ: 750 TABLET, EXTENDED RELEASE ORAL at 09:50

## 2017-06-07 NOTE — DISCHARGE INSTRUCTIONS
Discharge Instructions


Date of Service


Jun 7, 2017.





Admission


Reason for Admission:  Elbow Fracture, Left





Discharge


Discharge Diagnosis / Problem:  Left Elbow fracture S/P ORIF





Discharge Goals


Goal(s):  Decrease discomfort, Improve function





Activity Recommendations


Activity Limitations:  per Instructions/Follow-up section


Exercise/Sports Limitations:  as tolerated





.





Instructions / Follow-Up


Instructions / Follow-Up


Follow up with PCP  on 6/12/17 at 3:00 pm  


Follow up with Orthopedics  in 2 weeks





Current Hospital Diet


Patient's current hospital diet: Regular Diet





Discharge Diet


Recommended Diet:  Regular Diet





Procedures


Procedures Performed:  


Left open reduction internal fixation of olecranon fracture





Pending Studies


Studies pending at discharge:  no





Medical Emergencies








.


Who to Call and When:





Medical Emergencies:  If at any time you feel your situation is an emergency, 

please call 911 immediately.





.





Non-Emergent Contact


Non-Emergency issues call your:  Primary Care Provider, Surgeon


Call Non-Emergent contact if:  you have a fever, your pain is not controlled, 

your pain is worsening, your pain is unusual for you, you have any medication 

questions





.


.








"Provider Documentation" section prepared by Cristopher Urbano.








.





Consultant Recommendations


Consultant Recommendations:


ACTIVITY RECOMMENDATIONS:





*  Avoid lifting anything with the left upper extremity until your first post 

operative visit.








SPECIAL CARE INSTRUCTIONS:





*   Your bandage should be left in place until your follow up visit in 2 weeks.


*   Some drainage onto the dressing may occur.  This is normal.


*   If the bandage feels excessively tight, you may loosen the elastic


    bandage.  Then call the physician's office for further instructions.


*   If possible, keep your hand elevated above the level of your


    heart for the first 2 post operative days.  You may use a sling if 

necessary.


*   You should move your fingers regularly ( motions per hour)


    unless otherwise instructed.








SPECIAL PRECAUTIONS:





*  If you notice increased drainage, fever over 101 degrees F. or severe,


    unremitting pain, call your physician/office at [phone].


*  You may have been prescribed pain medication.  If you experience nausea


    and/or skin rash, discontinue this medication and contact our office for an 


    alternative medication.








FOLLOW UP VISIT:





If appointment is not already scheduled:





Please call [facility] [place] to make a follow-up appointment with 

Dr. Cormier 


for 2 weeks after your surgery at [phone].





VTE Core Measure


Inpt VTE Proph given/why not?:  Unfractionated heparin SQ

## 2017-06-07 NOTE — PROGRESS NOTE
Internal Med Progress Note


Date of Service:


Jun 7, 2017.


Provider Documentation:





SUBJECTIVE:





Seen and examined at bedside. States LUE pain is much controlled. 


Denies chest pain, SOB. Offers no other complaints. 





OBJECTIVE:





Vital Signs-as noted below





Physical Exam:


General Appearance:Moderately built and nourished, no apparent distress


Head:  normocephalic, Atraumatic 


Eyes:  normal inspection, EOMI, PERRL


Neck:  supple, Trachea midline


Respiratory/Chest: Normal breath sounds, CTA


Cardiovascular: S1, S2, No murmur


Abdomen/GI:Soft, Non tender, Bowel sounds present


Extremities/Musculoskelatal:normal inspection, no edema, LUE in surgical 

bandage 


Neurologic/Psych:AAOX3, grossly no focal neurological deficits 


Skin:  normal color, warm





Lab data as noted below.


ASSESSMENT & PLAN:


Patient is an 85 yr F with osteoporosis presents with L elbow fracture and 

multiple L rib fractures after traumatic fall.





LEFT OLECRANON FRACTURE


S/P MECHANICAL FALL 


S/O L Olecranon ORIF POD #2 


CXR- Rib fractures 7-10, left and no other acute abnormalities


Pain control


Advised to keep splint in place at all times.


Use Sling as needed 


Needs follow up with Dr. Cormier in 2 weeks


Ortho signed off.








Left Rib fractures:


X ray- 7th-10th , left non displaced fractures


Conservative management 








H/O Epilepsy 


Stable


Continue current medications








HTN


Stable


Continue losartan





OSTEOPOROSIS


Vitamin D level 41- normal


Continue with calcium supplements





ABNORMAL UA


No urinary symptoms


Urine cx- contaminated


S/P IV Rocephin: discontinued








H/O R hip Arthroplasty  








Chronic Hyponatremia:


Stable


Monitor








DVT Px:


heparin SQ








Code Status:


DNR








DISPOSITION


Plan to [facility]


, PT/OT consulted





Follow up with PCP  on 6/12/17 at 3:00 pm  


Follow up with Orthopedics  in 2 weeks


Vital Signs:











  Date Time  Temp Pulse Resp B/P (MAP) Pulse Ox O2 Delivery O2 Flow Rate FiO2


 


6/7/17 12:11 36.9 90 21 134/64 (87) 99 Room Air  


 


6/7/17 09:48  90  102/65 (77)    


 


6/7/17 08:35     97   


 


6/7/17 07:31     97 Room Air  


 


6/7/17 07:27 37.0 72 18 134/66 (88) 97 Room Air  


 


6/6/17 23:20      Room Air  


 


6/6/17 23:06 36.9 84 20 147/72 (97) 97 Room Air  


 


6/6/17 15:30      Room Air  


 


6/6/17 15:04 37.2  16 130/66 (87) 95 Room Air  








Lab Results:





Results Past 24 Hours








Test


  6/7/17


06:25 Range/Units


 


 


White Blood Count 8.11 4.8-10.8  K/uL


 


Red Blood Count 2.96 4.2-5.4  M/uL


 


Hemoglobin 9.3 12.0-16.0  g/dL


 


Hematocrit 27.4 37-47  %


 


Mean Corpuscular Volume 92.6   fL


 


Mean Corpuscular Hemoglobin 31.4 25-34  pg


 


Mean Corpuscular Hemoglobin


Concent 33.9


  32-36  g/dl


 


 


Platelet Count 205 130-400  K/uL


 


Mean Platelet Volume 8.5 7.4-10.4  fL


 


Neutrophils (%) (Auto) 61.4  %


 


Lymphocytes (%) (Auto) 17.5  %


 


Monocytes (%) (Auto) 18.4  %


 


Eosinophils (%) (Auto) 1.8  %


 


Basophils (%) (Auto) 0.5  %


 


Neutrophils # (Auto) 4.98 1.4-6.5  K/uL


 


Lymphocytes # (Auto) 1.42 1.2-3.4  K/uL


 


Monocytes # (Auto) 1.49 0.11-0.59  K/uL


 


Eosinophils # (Auto) 0.15 0-0.5  K/uL


 


Basophils # (Auto) 0.04 0-0.2  K/uL


 


RDW Standard Deviation 44.0 36.4-46.3  fL


 


RDW Coefficient of Variation 12.8 11.5-14.5  %


 


Immature Granulocyte % (Auto) 0.4  %


 


Immature Granulocyte # (Auto) 0.03 0.00-0.02  K/uL


 


Sodium Level 126 136-145  mmol/L


 


Potassium Level 3.9 3.5-5.1  mmol/L


 


Chloride Level 92   mmol/L


 


Carbon Dioxide Level 26 21-32  mmol/L


 


Anion Gap 8.0 3-11  mmol/L


 


Blood Urea Nitrogen 11 7-18  mg/dl


 


Creatinine


  0.61


  0.60-1.20


mg/dl


 


Est Creatinine Clear Calc


Drug Dose 44.7


   ml/min


 


 


Estimated GFR (


American) 95.8


   


 


 


Estimated GFR (Non-


American 82.7


   


 


 


BUN/Creatinine Ratio 17.9 10-20  


 


Random Glucose 92 70-99  mg/dl


 


Calcium Level 8.9 8.5-10.1  mg/dl

## 2017-07-02 ENCOUNTER — HOSPITAL ENCOUNTER (EMERGENCY)
Dept: HOSPITAL 45 - C.EDB | Age: 82
Discharge: HOME | End: 2017-07-02
Payer: COMMERCIAL

## 2017-07-02 VITALS
WEIGHT: 89.95 LBS | WEIGHT: 89.95 LBS | BODY MASS INDEX: 16.34 KG/M2 | HEIGHT: 62.01 IN | HEIGHT: 62.01 IN | BODY MASS INDEX: 16.34 KG/M2

## 2017-07-02 VITALS — TEMPERATURE: 98.42 F

## 2017-07-02 VITALS — SYSTOLIC BLOOD PRESSURE: 138 MMHG | OXYGEN SATURATION: 98 % | DIASTOLIC BLOOD PRESSURE: 74 MMHG | HEART RATE: 65 BPM

## 2017-07-02 DIAGNOSIS — I10: ICD-10-CM
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
## 2017-07-02 DIAGNOSIS — Z87.828: ICD-10-CM

## 2017-07-02 DIAGNOSIS — Z82.49: ICD-10-CM

## 2017-07-02 DIAGNOSIS — G40.909: ICD-10-CM

## 2017-07-02 DIAGNOSIS — Z79.899: ICD-10-CM

## 2017-07-02 DIAGNOSIS — M79.602: Primary | ICD-10-CM

## 2017-07-02 DIAGNOSIS — M81.0: ICD-10-CM
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
## 2017-07-02 NOTE — DIAGNOSTIC IMAGING REPORT
AP CHEST WITH LEFT-SIDED RIB SERIES



CLINICAL HISTORY:  Fall. Left rib pain.



FINDINGS: An AP supine chest radiograph with 3 additional views from a

left-sided rib series is compared to study dated 6/4/2017. The AP view is

degraded by patient rotation. The heart is top normal for projection and there

is atherosclerotic calcification of the thoracic aorta. Chronic interstitial

thickening is unchanged. No airspace consolidation, pleural effusion, or

pneumothorax is seen. The skeletal structures are osteopenic. There are numerous

healed left-sided rib fractures. There are subacute and nondistracted left

anterolateral 7th through 10th rib fractures seen on the rib series. These were

also identified on 6/4/2017. No new fracture is identified. Degenerative change

and moderate scoliosis is noted in the thoracic spine. Fusion hardware is

partially imaged in the lumbar spine. Postoperative change is also seen in the

left elbow.



IMPRESSION:



1. The lungs are clear and there is no pneumothorax.



2.No acute left-sided rib fracture is identified on the rib series. There are

subacute left anterolateral 7th through 10th rib fractures. These were also seen

on 6/4/2017.







Electronically signed by:  Fredi Conway M.D.

7/2/2017 11:24 AM



Dictated Date/Time:  7/2/2017 11:22 AM

## 2017-07-02 NOTE — DIAGNOSTIC IMAGING REPORT
LEFT SHOULDER 3 VIEWS



CLINICAL HISTORY: Fall with left shoulder pain.



FINDINGS: 3 views of the left shoulder are obtained. Correlation is made with

left-sided rib series performed concurrently on 7/2/2017. The skeletal

structures are osteopenic. There is no radiographic evidence of left shoulder

fracture or dislocation. The acromioclavicular joint is maintained. Mild to

moderate arthritic change is seen at the glenohumeral articulation. The

overlying soft tissues are within normal limits. There are several subacute and

healing left-sided rib fractures. There is atherosclerotic calcification of the

thoracic aorta. The imaged left lung parenchyma appears clear.



IMPRESSION: Osteopenia and arthritic change as above. No fracture or dislocation

is seen in the left shoulder.







Electronically signed by:  Fredi Conway M.D.

7/2/2017 11:29 AM



Dictated Date/Time:  7/2/2017 11:28 AM

## 2017-07-02 NOTE — EMERGENCY ROOM VISIT NOTE
History


Report prepared by Karina:  Grayson Slaughter


Under the Supervision of:  Dr. Klaudia Gonzalez D.O.


First contact with patient:  09:52


Chief Complaint:  ARM PAIN


Stated Complaint:  FALL/ARM PAIN





History of Present Illness


The patient is a 85 year old female who presents to the Emergency Room with 

complaints of constant left arm pain s/p fall occurring just prior to arrival. 

She is a resident at the [facility]. She states that she fell shortly after waking up 

while searching for her walker. The patient believes that she tripped on 

something while trying to use her one handed walker. She notes that her left 

lower arm and left sided ribs are broken from a previous fall. She states that 

she broke the arm several weeks ago, and the fracture required surgery. She 

rates her current pain as a 5/10 in severity. The patient feels that she landed 

primarily on her left shoulder this morning. She did not hit her head or lose 

consciousness. She denies any chest pain, abdominal pain, hip pain, numbness, 

weakness, visual changes, or SOB. The patient has not had her normal chronic 

pain medicine today.





   Source of History:  patient


   Onset:  Just prior to arrival


   Position:  arm (left)


   Symptom Intensity:  5/10


   Timing:  constant


   Associated Symptoms:  No LOC, No chest pain, No SOB, No abdominal pain


Note:


The patient denies any visual changes.





Review of Systems


See HPI for pertinent positives & negatives. A total of 10 systems reviewed and 

were otherwise negative.





Past Medical & Surgical


Medical Problems:


(1) Benign hypertension


(2) Elbow fracture, left


(3) HTN (hypertension)


(4) Osteoarthritis


(5) Osteoporosis


(6) Seizure disorder


Surgical Problems:


(1) Status post hip replacement


(2) Status post lumbar laminectomy


(3) Status post tonsillectomy








Family History





Hypertension





Social History


Smoking Status:  Never Smoker


Alcohol Use:  none


Drug Use:  none


Marital Status:  


Housing Status:  lives alone


Occupation Status:  retired





Current/Historical Medications


Scheduled


Calcium Carbonate-Vitamin D (Calcium 600 + D), 1 TAB PO BID


Carbamazepine (Carbatrol Er), 200 MG PO BID


Ergocalciferol (Drisdol), 1 CAP PO MONTHLY


Fentanyl (Fentanyl), 12 MCG TOP Q3D


Losartan Potassium (Cozaar), 1 TAB PO DAILY


Multiple Vitamins W/ Minerals (Preservision Areds), 2 CAP PO DAILY


Potassium Chloride (K-Tabs), 10 MEQ PO DAILY


Tolterodine Tartrate (Tolterodine Tartrate), 2 MG PO BID


Zonisamide (Zonegran), 100 MG PO BID





Scheduled PRN


Hydrocodone/Acetaminophen (Norco 10/325 Tab), 1 TAB PO Q4H PRN for Pain





Allergies


Coded Allergies:  


     ACE Inhibitors (Verified  Allergy, Intermediate, COUGH, 7/2/17)


     Adhesives (Verified  Allergy, Intermediate, SEVERE BLISTERS, 7/2/17)


     Latex1 -Allergic Contact Dermititis (Verified  Allergy, Mild, RASH, 

BLISTERS, 7/2/17)


     Alendronate (Verified  Allergy, Unknown, Unknown., 7/2/17)


 PT list.


     Penicillins (Verified  Allergy, Unknown, RASH, 7/2/17)


     Ranitidine (Verified  Allergy, Unknown, Unknown., 7/2/17)


 PT list.


     Tetracycline (Verified  Allergy, Unknown, UNKNOWN, 7/2/17)


     Topiramate (Verified  Allergy, Unknown, UNKNOWN, 7/2/17)


     Valproic Acid and Related (Verified  Adverse Reaction, Intermediate, 

DIPLOPIA;SEVERE WT LOSS, 7/2/17)


     Beta Adrenergic Blockers (Verified  Adverse Reaction, Mild, FATIGUE, 7/2/17

)


     Codeine (Verified  Adverse Reaction, Mild, NAUSEA, 7/2/17)


     Nifedipine (Verified  Adverse Reaction, Mild, ABD BLOATING, 7/2/17)





Physical Exam


Vital Signs











  Date Time  Temp Pulse Resp B/P (MAP) Pulse Ox O2 Delivery O2 Flow Rate FiO2


 


7/2/17 12:37  65 20 138/74 98 Room Air  


 


7/2/17 11:30  64 18 146/75 99 Room Air  


 


7/2/17 09:32 36.9 67 16 155/109 100 Room Air  











Physical Exam


GENERAL: alert, well appearing, well nourished, no distress, non-toxic 


EYE EXAM: normal conjunctiva, PERRL and EOM's grossly intact


OROPHARYNX: no exudate, no erythema, lips, buccal mucosa, and tongue normal and 

mucous membranes are moist


NECK: supple, no nuchal rigidity, no adenopathy, non-tender


LUNGS: Clear to auscultation. Normal chest wall mechanics


HEART: no murmurs, S1 normal and S2 normal 


ABDOMEN: abdomen soft, non-tender, normo-active bowel sounds, no masses, no 

rebound or guarding. 


BACK: Back is symmetrical on inspection and there is no deformity, no midline 

tenderness, no CVA tenderness. 


SKIN: no rashes and no bruising 


UPPER EXTREMITIES: LUE in a brace/x-fix device. Normal pulses. Normal capillary 

refill. Sensation intact. Pain at the left shoulder with no obvious deformity. 

Decreased ROM secondary to pain. Remainder of extremities are atraumatic. 


LOWER EXTREMITIES: No pitting edema.


NEURO EXAM: Normal sensorium, cranial nerves II-XII grossly intact, normal 

speech,  no gross weakness of arms, no gross weakness of legs.





Medical Decision & Procedures


ER Provider


Diagnostic Interpretation:


Radiology results have been interpreted by the radiologist and reviewed by me.





LEFT SHOULDER 3 VIEWS





FINDINGS: 3 views of the left shoulder are obtained. Correlation is made with


left-sided rib series performed concurrently on 7/2/2017. The skeletal


structures are osteopenic. There is no radiographic evidence of left shoulder


fracture or dislocation. The acromioclavicular joint is maintained. Mild to


moderate arthritic change is seen at the glenohumeral articulation. The


overlying soft tissues are within normal limits. There are several subacute and


healing left-sided rib fractures. There is atherosclerotic calcification of the


thoracic aorta. The imaged left lung parenchyma appears clear.





IMPRESSION: Osteopenia and arthritic change as above. No fracture or dislocation


is seen in the left shoulder.





Electronically signed by:  Fredi Conway M.D.








AP CHEST WITH LEFT-SIDED RIB SERIES





FINDINGS: An AP supine chest radiograph with 3 additional views from a


left-sided rib series is compared to study dated 6/4/2017. The AP view is


degraded by patient rotation. The heart is top normal for projection and there


is atherosclerotic calcification of the thoracic aorta. Chronic interstitial


thickening is unchanged. No airspace consolidation, pleural effusion, or


pneumothorax is seen. The skeletal structures are osteopenic. There are numerous


healed left-sided rib fractures. There are subacute and nondistracted left


anterolateral 7th through 10th rib fractures seen on the rib series. These were


also identified on 6/4/2017. No new fracture is identified. Degenerative change


and moderate scoliosis is noted in the thoracic spine. Fusion hardware is


partially imaged in the lumbar spine. Postoperative change is also seen in the


left elbow.





IMPRESSION:





1. The lungs are clear and there is no pneumothorax.





2.No acute left-sided rib fracture is identified on the rib series. There are


subacute left anterolateral 7th through 10th rib fractures. These were also seen


on 6/4/2017.





Electronically signed by:  Fredi Conway M.D.








SINGLE VIEW PELVIS





FINDINGS: An AP pelvic radiograph is compared to study dated 11/10/2011.


Correlation is made with pelvic CT dated 1/24/2012. The skeletal structures are


osteopenic. No fracture is identified in the hips or bony pelvis. A right hip


arthroplasty is in near-anatomic alignment. No periprosthetic lucency is seen.


Moderate to advanced arthritic change is noted in the left hip. Lumbosacral


spondylosis is partially imaged. Sclerotic change is noted in the sacroiliac


joints. The overlying soft tissues are within normal limits. There is a


nonobstructed abdominal bowel gas pattern. Pelvic calcifications are similar to


2011.





IMPRESSION:





1. No acute fracture is identified involving the hips or bony pelvis.





2. Osteopenia, arthritic change, and right hip arthroplasty as above.





Electronically signed by:  Fredi Conway M.D.








LEFT FOREARM 2 VIEWS





FINDINGS: AP and lateral views of the left forearm are obtained. No prior


studies are available for comparison at the time of dictation. The examination


is degraded by overlying brace/traction device. The skeletal structures are


osteopenic. There is no clear radiographic evidence of left forearm fracture.


Postoperative change/pinning is noted in the proximal ulna. Arthritic changes


are noted in the wrist and elbow joints. Mild soft tissue swelling is present.





IMPRESSION:





1. Soft tissues swelling with no clear evidence of acute left forearm fracture.





2. Osteopenia and postoperative change as above.





Electronically signed by:  Fredi Conway M.D.








LEFT ELBOW 3 VIEWS





FINDINGS: 3 views of left elbow are compared to study dated 6/5/2017. The


examination is degraded by an overlying brace/traction device, especially the


lateral view. There are postoperative changes seen from pinning of the olecranon


process of the ulna. No acute fracture is clearly identified. The joint spaces


appear maintained. Degenerative spurring is noted along the medial and lateral


aspect of the joint space. Joint effusion cannot be assessed on the lateral


view. Soft tissue swelling is present around the elbow.





IMPRESSION:





1. Soft tissue swelling with no clear evidence of acute fracture.





2. Osteopenia, degenerative change, and postoperative change as above.





Electronically signed by:  Fredi Conway M.D.





Medications Administered











 Medications


  (Trade)  Dose


 Ordered  Sig/Bautista


 Route  Start Time


 Stop Time Status Last Admin


Dose Admin


 


 Oxycodone HCl


  (Roxicodone


 Immediate Rel Tab)  5 mg  NOW  STAT


 PO  7/2/17 10:07


 7/2/17 10:08 DC 7/2/17 10:16


5 MG


 


 Losartan Potassium


  (coZAAR TAB)  50 mg  QAM


 PO  7/3/17 09:00


 7/3/17 09:00 DC 7/2/17 10:36


50 MG











ED Course


0957: The patient was evaluated in room B7. A complete history and physical 

exam was performed. 





1007: Ordered Cozaar Tab 50 mg PO, Roxicodone Immediate Rel Tab 5 mg PO.





1155: I updated the patient. She will return to rehab. 





1205: Upon reevaluation, the patient is feeling better. I discussed the 

findings and the treatment plan with the patient.  She verbalizes agreement and 

understanding.  The patient was discharged home.





Medical Decision


Differential diagnosis:


Etiologies such as fracture, dislocation, neurovascular compromise, compartment 

syndrome, soft tissue injury, as well as others were entertained.





No evidence of new or acute injury including fracture to left upper extremity 

which had previously been injured.  Doubt any additional culture medic 

intrathoracic/intra-abdominal injury.  Patient denies head injury or LOC.  

Patient well-appearing here with stable vital signs, pain controlled with her 

dose of her chronic pain medication that was missed this morning.  Discussed 

with patient close follow-up for recheck by her family doctor as well as 

orthopedics given the recent injuries, discussed symptoms to watch and return 

for, she verbalized understanding was agreeable with plan.





Impression





 Primary Impression:  


 Fall


 Additional Impression:  


 Left arm pain





Scribe Attestation


The scribe's documentation has been prepared under my direction and personally 

reviewed by me in its entirety. I confirm that the note above accurately 

reflects all work, treatment, procedures, and medical decision making performed 

by me.





Departure Information


Dispostion


Home / Self-Care





Referrals


Anthony Mcclain M.D. (PCP)





Patient Instructions


My [facility]





Additional Instructions





Please continue rehabilitation as previously prescribed.  Please continue 

regular medications.  If you develop any worsening pain, increased swelling, 

numbness or tingling, you've any other new concerns, please return the 

emergency room.  Please continue wearing the brace is previous liters by 

orthopedics.  Please do not walk without assistance, and always use your walker.





Problem Qualifiers








 Primary Impression:  


 Fall


 Encounter type:  initial encounter  Qualified Codes:  W19.XXXA - Unspecified 

fall, initial encounter

## 2017-07-02 NOTE — DIAGNOSTIC IMAGING REPORT
SINGLE VIEW PELVIS



CLINICAL HISTORY: Fall.



FINDINGS: An AP pelvic radiograph is compared to study dated 11/10/2011.

Correlation is made with pelvic CT dated 1/24/2012. The skeletal structures are

osteopenic. No fracture is identified in the hips or bony pelvis. A right hip

arthroplasty is in near-anatomic alignment. No periprosthetic lucency is seen.

Moderate to advanced arthritic change is noted in the left hip. Lumbosacral

spondylosis is partially imaged. Sclerotic change is noted in the sacroiliac

joints. The overlying soft tissues are within normal limits. There is a

nonobstructed abdominal bowel gas pattern. Pelvic calcifications are similar to

2011.



IMPRESSION:



1. No acute fracture is identified involving the hips or bony pelvis.



2. Osteopenia, arthritic change, and right hip arthroplasty as above.







Electronically signed by:  Fredi Conway M.D.

7/2/2017 11:12 AM



Dictated Date/Time:  7/2/2017 11:09 AM

## 2017-07-02 NOTE — DIAGNOSTIC IMAGING REPORT
LEFT FOREARM 2 VIEWS



CLINICAL HISTORY: Fall with left arm pain.



FINDINGS: AP and lateral views of the left forearm are obtained. No prior

studies are available for comparison at the time of dictation. The examination

is degraded by overlying brace/traction device. The skeletal structures are

osteopenic. There is no clear radiographic evidence of left forearm fracture.

Postoperative change/pinning is noted in the proximal ulna. Arthritic changes

are noted in the wrist and elbow joints. Mild soft tissue swelling is present.



IMPRESSION:



1. Soft tissues swelling with no clear evidence of acute left forearm fracture.



2. Osteopenia and postoperative change as above.







Electronically signed by:  Fredi Conway M.D.

7/2/2017 11:42 AM



Dictated Date/Time:  7/2/2017 11:41 AM

## 2017-07-02 NOTE — DIAGNOSTIC IMAGING REPORT
LEFT ELBOW 3 VIEWS



CLINICAL HISTORY: Fall with left elbow pain.



FINDINGS: 3 views of left elbow are compared to study dated 6/5/2017. The

examination is degraded by an overlying brace/traction device, especially the

lateral view. There are postoperative changes seen from pinning of the olecranon

process of the ulna. No acute fracture is clearly identified. The joint spaces

appear maintained. Degenerative spurring is noted along the medial and lateral

aspect of the joint space. Joint effusion cannot be assessed on the lateral

view. Soft tissue swelling is present around the elbow.



IMPRESSION:



1. Soft tissue swelling with no clear evidence of acute fracture.



2. Osteopenia, degenerative change, and postoperative change as above.







Electronically signed by:  Fredi Conway M.D.

7/2/2017 11:41 AM



Dictated Date/Time:  7/2/2017 11:38 AM

## 2017-08-01 ENCOUNTER — HOSPITAL ENCOUNTER (OUTPATIENT)
Dept: HOSPITAL 45 - C.LABOAKS | Age: 82
Discharge: HOME | End: 2017-08-01
Attending: INTERNAL MEDICINE
Payer: COMMERCIAL

## 2017-08-01 DIAGNOSIS — M81.0: Primary | ICD-10-CM

## 2017-08-01 LAB
ANION GAP SERPL CALC-SCNC: 5 MMOL/L (ref 3–11)
BUN SERPL-MCNC: 22 MG/DL (ref 7–18)
BUN/CREAT SERPL: 29.1 (ref 10–20)
CALCIUM SERPL-MCNC: 9.4 MG/DL (ref 8.5–10.1)
CHLORIDE SERPL-SCNC: 96 MMOL/L (ref 98–107)
CO2 SERPL-SCNC: 29 MMOL/L (ref 21–32)
CREAT SERPL-MCNC: 0.75 MG/DL (ref 0.6–1.2)
GLUCOSE SERPL-MCNC: 90 MG/DL (ref 70–99)
POTASSIUM SERPL-SCNC: 4.2 MMOL/L (ref 3.5–5.1)
SODIUM SERPL-SCNC: 130 MMOL/L (ref 136–145)

## 2017-08-18 ENCOUNTER — HOSPITAL ENCOUNTER (OUTPATIENT)
Dept: HOSPITAL 45 - C.LABSPEC | Age: 82
Discharge: HOME | End: 2017-08-18
Attending: FAMILY MEDICINE
Payer: COMMERCIAL

## 2017-08-18 DIAGNOSIS — R19.7: Primary | ICD-10-CM

## 2018-02-05 LAB
BASOPHILS # BLD: 0.03 K/UL (ref 0–0.2)
BASOPHILS NFR BLD: 0.5 %
BUN SERPL-MCNC: 27 MG/DL (ref 7–18)
CALCIUM SERPL-MCNC: 9.8 MG/DL (ref 8.5–10.1)
CO2 SERPL-SCNC: 27 MMOL/L (ref 21–32)
CREAT SERPL-MCNC: 0.77 MG/DL (ref 0.6–1.2)
EOS ABS #: 0.03 K/UL (ref 0–0.5)
EOSINOPHIL NFR BLD AUTO: 218 K/UL (ref 130–400)
GLUCOSE SERPL-MCNC: 100 MG/DL (ref 70–99)
HCT VFR BLD CALC: 39.5 % (ref 37–47)
HGB BLD-MCNC: 13.1 G/DL (ref 12–16)
IG#: 0.01 K/UL (ref 0–0.02)
IMM GRANULOCYTES NFR BLD AUTO: 21.6 %
INR PPP: 1 (ref 0.9–1.1)
LYMPHOCYTES # BLD: 1.29 K/UL (ref 1.2–3.4)
MCH RBC QN AUTO: 32.3 PG (ref 25–34)
MCHC RBC AUTO-ENTMCNC: 33.2 G/DL (ref 32–36)
MCV RBC AUTO: 97.5 FL (ref 80–100)
MONO ABS #: 0.52 K/UL (ref 0.11–0.59)
MONOCYTES NFR BLD: 8.7 %
NEUT ABS #: 4.1 K/UL (ref 1.4–6.5)
NEUTROPHILS # BLD AUTO: 0.5 %
NEUTROPHILS NFR BLD AUTO: 68.5 %
PMV BLD AUTO: 9.1 FL (ref 7.4–10.4)
POTASSIUM SERPL-SCNC: 4 MMOL/L (ref 3.5–5.1)
PTT PATIENT: 26 SECONDS (ref 21–31)
RED CELL DISTRIBUTION WIDTH CV: 12.9 % (ref 11.5–14.5)
RED CELL DISTRIBUTION WIDTH SD: 46 FL (ref 36.4–46.3)
SODIUM SERPL-SCNC: 138 MMOL/L (ref 136–145)
WBC # BLD AUTO: 5.98 K/UL (ref 4.8–10.8)

## 2018-02-05 NOTE — PAT MEDICATION INSTRUCTIONS
Service Date


Feb 5, 2018.





Current Home Medication List


Calcium Carbonate-Vitamin D (Calcium 600 + D), 1 TAB PO BID


Carbamazepine (Carbatrol Er), 200 MG PO BID


Ergocalciferol (Drisdol), 1 CAP PO EVERY OTHER MONTH


Fentanyl (Fentanyl), 12 MCG TOP EVERY 72 HOUR


Lorazepam (Ativan), 0.5 MG PO Q6H PRN for SEIZURE


Losartan Potassium (Cozaar), 1 TAB PO QAM


Multiple Vitamins W/ Minerals (Preservision Areds), 2 CAP PO DAILY


Potassium Chloride (K-Tabs), 10 MEQ PO QAM


Tolterodine Tartrate (Tolterodine Tartrate), 2 MG PO BID


Zonisamide (Zonegran), 100 MG PO BID





Medication Instructions


For Your Scheduled Surgery 





-Continue as directed:


Ergocalciferol (Drisdol), 1 CAP PO EVERY OTHER MONTH








- Hold the following medications the morning of surgery:


Multiple Vitamins W/ Minerals (Preservision Areds), 2 CAP PO DAILY


Potassium Chloride (K-Tabs), 10 MEQ PO QAM


Losartan Potassium (Cozaar), 1 TAB PO QAM


Fentanyl (Fentanyl), 12 MCG TOP EVERY 72 HOUR


Calcium Carbonate-Vitamin D (Calcium 600 + D), 1 TAB PO BID








- Take the following medications the morning of surgery with a sip of water:


Zonisamide (Zonegran), 100 MG PO BID


Tolterodine Tartrate (Tolterodine Tartrate), 2 MG PO BID


Lorazepam (Ativan), 0.5 MG PO Q6H PRN for SEIZURE


Carbamazepine (Carbatrol Er), 200 MG PO BID








- Take the following medications as scheduled the night before surgery:


Zonisamide (Zonegran), 100 MG PO BID


Tolterodine Tartrate (Tolterodine Tartrate), 2 MG PO BID


Lorazepam (Ativan), 0.5 MG PO Q6H PRN for SEIZURE


Calcium Carbonate-Vitamin D (Calcium 600 + D), 1 TAB PO BID


Carbamazepine (Carbatrol Er), 200 MG PO BID








If you have any questions please call us at [phone] or [phone] or 

[phone]

## 2018-03-01 NOTE — HISTORY AND PHYSICAL
History & Physical


Date


Mar 1, 2018.





Chief Complaint


left elbow pain





History of Present Illness


The patient is a 86 year old female with complaints of left elbow pain with 

painful hardware. She had an ORIF of her olecranon in June of 2017. She 

progressed well throughout the post operative period but is able to feel the 

hardware and is having pain. She is being set up for surgical removal of the 

hardware.





Past Medical/Surgical History


Medical Problems:


(1) Benign hypertension


(2) Elbow fracture, left


(3) HTN (hypertension)


(4) Osteoarthritis


(5) Osteoporosis


(6) Seizure disorder


Surgical Problems:


(1) Status post hip replacement


(2) Status post lumbar laminectomy


(3) Status post tonsillectomy








Allergies


Coded Allergies:  


     Penicillins (Verified  Allergy, Severe, THROAT SWELLS, 3/1/18)


     Adhesives (Verified  Allergy, Intermediate, SEVERE BLISTERS, 2/5/18)


     Latex1 -Allergic Contact Dermititis (Verified  Allergy, Mild, RASH, 

BLISTERS, 2/5/18)


     Ranitidine (Verified  Allergy, Mild, RASH, 2/5/18)


     Triamcinolone (Verified  Allergy, Mild, GENERALIZED RASH, 2/5/18)


     Tetracycline (Verified  Allergy, Unknown, UNKNOWN, 2/5/18)


     Topiramate (Verified  Allergy, Unknown, UNKNOWN, 2/5/18)


     ACE Inhibitors (Verified  Adverse Reaction, Intermediate, COUGH, 3/1/18)


     Valproic Acid and Related (Verified  Adverse Reaction, Intermediate, 

DIPLOPIA;SEVERE WT LOSS, 2/5/18)


     Alendronate (Verified  Adverse Reaction, Mild, N/V, 3/1/18)


     Beta Adrenergic Blockers (Verified  Adverse Reaction, Mild, FATIGUE, 2/5/18

)


     Codeine (Verified  Adverse Reaction, Mild, NAUSEA, 2/5/18)


     Nifedipine (Verified  Adverse Reaction, Mild, ABD BLOATING, 2/5/18)





Home Medications


Scheduled


Calcium Carbonate-Vitamin D (Calcium 600 + D), 1 TAB PO BID


Carbamazepine (Carbatrol Er), 200 MG PO BID


Ergocalciferol (Drisdol), 1 CAP PO EVERY OTHER MONTH


Fentanyl (Fentanyl), 12 MCG TOP EVERY 72 HOUR


Losartan Potassium (Cozaar), 1 TAB PO QAM


Multiple Vitamins W/ Minerals (Preservision Areds), 2 CAP PO DAILY


Potassium Chloride (K-Tabs), 10 MEQ PO QAM


Tolterodine Tartrate (Tolterodine Tartrate), 2 MG PO BID


Zonisamide (Zonegran), 100 MG PO BID





Scheduled PRN


Lorazepam (Ativan), 0.5 MG PO Q6H PRN for SEIZURE





Physical Examination


Skin:  warm/dry, no rash, + pertinent finding (Well healed left elbow incision)


Eyes:  normal inspection


ENT:  normal ENT inspection


Head:  normocephalic, atraumatic


Neck:  supple, no adenopathy, trachea midline


Respiratory/Chest:  lungs clear, normal breath sounds, no respiratory distress


Cardiovascular:  regular rate, rhythm


Abdomen / GI:  normal bowel sounds, non tender


Extremities:  + pertinent finding (left elbow: tender over the olecranon and 

palpable hardware. Good ROM of the left elbow. )


Neurologic/Psych:  no motor/sensory deficits, alert, oriented x 3





Diagnosis


Left elbow painful hardware s/p ORIF left olecranon fx.





Plan of Treatment


Recommend a left elbow removal hardware from olecranon.


All potential risks, benefits, complications, alternatives, and rehab have been 

discussed and she wishes to proceed.


She will be scheduled for 3.2.18.

## 2018-03-02 ENCOUNTER — HOSPITAL ENCOUNTER (OUTPATIENT)
Dept: HOSPITAL 45 - C.ACU | Age: 83
Discharge: HOME | End: 2018-03-02
Payer: COMMERCIAL

## 2018-03-02 VITALS
HEART RATE: 57 BPM | TEMPERATURE: 97.88 F | DIASTOLIC BLOOD PRESSURE: 63 MMHG | SYSTOLIC BLOOD PRESSURE: 133 MMHG | OXYGEN SATURATION: 97 %

## 2018-03-02 VITALS
HEIGHT: 60 IN | WEIGHT: 82.45 LBS | BODY MASS INDEX: 16.19 KG/M2 | BODY MASS INDEX: 16.19 KG/M2 | HEIGHT: 60 IN | WEIGHT: 82.45 LBS

## 2018-03-02 VITALS
SYSTOLIC BLOOD PRESSURE: 162 MMHG | OXYGEN SATURATION: 98 % | TEMPERATURE: 97.52 F | HEART RATE: 60 BPM | DIASTOLIC BLOOD PRESSURE: 94 MMHG

## 2018-03-02 VITALS
TEMPERATURE: 98.42 F | OXYGEN SATURATION: 98 % | HEART RATE: 75 BPM | DIASTOLIC BLOOD PRESSURE: 83 MMHG | SYSTOLIC BLOOD PRESSURE: 162 MMHG

## 2018-03-02 VITALS
TEMPERATURE: 97.52 F | HEART RATE: 65 BPM | OXYGEN SATURATION: 99 % | SYSTOLIC BLOOD PRESSURE: 150 MMHG | DIASTOLIC BLOOD PRESSURE: 94 MMHG

## 2018-03-02 DIAGNOSIS — Z79.899: ICD-10-CM

## 2018-03-02 DIAGNOSIS — Z98.42: ICD-10-CM

## 2018-03-02 DIAGNOSIS — Z90.89: ICD-10-CM
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
## 2018-03-02 DIAGNOSIS — T84.84XA: Primary | ICD-10-CM

## 2018-03-02 DIAGNOSIS — Z88.6: ICD-10-CM

## 2018-03-02 DIAGNOSIS — G40.909: ICD-10-CM

## 2018-03-02 DIAGNOSIS — Z98.41: ICD-10-CM

## 2018-03-02 DIAGNOSIS — I10: ICD-10-CM

## 2018-03-02 DIAGNOSIS — Z88.1: ICD-10-CM

## 2018-03-02 DIAGNOSIS — Z98.890: ICD-10-CM

## 2018-03-02 DIAGNOSIS — Z91.040: ICD-10-CM

## 2018-03-02 DIAGNOSIS — M19.90: ICD-10-CM

## 2018-03-02 DIAGNOSIS — M24.522: ICD-10-CM

## 2018-03-02 DIAGNOSIS — Z88.0: ICD-10-CM

## 2018-03-02 NOTE — [facility] POST OPERATIVE BRIEF NOTE
Immediate Operative Summary


Operative Date


Mar 2, 2018.





Pre-Operative Diagnosis





Left olecranon painful retained deep hardware. Left elbow contracture.





Post-Operative Diagnosis





Left olecranon painful retained deep hardware. Left elbow contracture.





Procedure(s) Performed





1. Left elbow removal deep painful retained hardware.


2. MEAGAN  Left elbow contracture.





Surgeon


B Genia BARRON





Assistant Surgeon(s)


MASON Lakhani PA-C





Estimated Blood Loss


1cc





Findings


Consistent with Post-Op Diagnosis





Specimens





Explanted hardware left elbow





Drains


None





Anesthesia Type


[facility]





Complication(s)


none





Disposition


Accompanied Pt To Recover:  no


Disposition:  Recovery Room / PACU

## 2018-03-02 NOTE — OPERATIVE REPORT
DATE OF OPERATION:  03/02/2018

 

PREOPERATIVE DIAGNOSES:

1. Left elbow painful retained deep hardware.

2. Left elbow contracture.

 

POSTOPERATIVE DIAGNOSES:

1. Left elbow painful retained deep hardware.

2. Left elbow contracture.

 

PROCEDURES:

1.  Left elbow removal deep painful retained hardware x3 incisions.

2.  Manipulation under anesthesia, left elbow.

 

SURGEON:  Dr. Cormier.

 

FIRST ASSISTANT:  Agustín Lakhani PA-C who was present for patient

positioning,

sterile prep and drape, management of retractors and instruments.  He was

present through the critical portions of the case including wound closure,

application of sterile dressing and transport of the patient to recovery.

 

ANESTHESIA:  Monitored anesthesia care with regional block, left upper

extremity.

 

SPECIMEN:  Explanted hardware, left elbow.

 

DRAINS:  None.

 

COMPLICATIONS:  None.

 

BLOOD LOSS:  1 mL.

 

PERTINENT HISTORY:  This is an 86-year-old female who sustained a prior fall

and fracture of her left elbow olecranon.  She underwent open reduction

internal fixation had an uneventful recovery course had some stiffness and

moderate contracture of the left elbow and subsequently had pain with related

to the hardware becoming more prominent as her edema and swelling had

reduced.  The patient was scheduled for surgery as indicated.

 

All potential risks, benefits, complications, alternatives, rehab, potential

for incomplete relief of symptoms, need for further surgery, DVT, PE, death,

persistent pain, swelling, scarring, weakness, neurovascular injury, wound

complications, bone fracture were discussed with the patient.  The patient

decided to proceed with the procedure as indicated.

 

DESCRIPTION OF PROCEDURE:  After regional block was performed in the holding

area by the anesthesiologist, the patient was then taken to the operative

suite, placed supine on the operating room table.  I reviewed consent and

identification of proper operative site, the patient was sedated and left

upper extremity had a tourniquet applied high on the arm over cast padding. 

The patient was then sedated, consent was reviewed and proper operative site

was identified.  The left upper extremity was then sterilely prepped and

draped in usual fashion, elevated and tourniquet inflated to 250 mmHg.  A 15

blade scalpel was used to make an incision in the midline of the olecranon at

the site of prior incision.  There was a second incision was made over the

medial K wire palpable to the tissue and then a third incision was made over

the lateral K wire palpable under the tissue.  Next, careful dissection was

performed to the subcutaneous fat and tissue down to the level of the

hardware at all 3 sites.  The hardware was clearly visualized.  The K wires

were first pulled with a heavy needle  and removed without difficulty. 

Next, the tension band 18 gauge surgical steel wire was then cut in 2

separate locations with a  and then removed without difficulty. 

Next, the wound was copiously irrigated with sterile normal saline.  Skin was

closed with 3-0 Vicryl in the dermis and then 4-0 nylon sutures in the skin. 

Final radiographs were obtained confirming removal of all hardware in the

left elbow and then a manipulation under anesthesia was performed with full

flexion to approximately 140 degrees of flexion and full extension to 0

degrees.  Therefore, arc range of motion was approximately 0-140 degrees. 

Supination 85 degrees and pronation 85 degrees.  Palpable release of

adhesions was appreciated with full flexion and full extension of the left

elbow, improving range of motion.  Next, a sterile compressive dressing was

applied overwrapped with Ace wrap, the patient was awakened, the tourniquet

was released and then she was taken to recovery in stable condition.

 

 

I attest to the content of the Intraoperative Record and any orders documented therein. Any exception
s are noted below.

## 2018-03-02 NOTE — DIAGNOSTIC IMAGING REPORT
FLUOROSCOPIC IMAGES OF THE LEFT ELBOW



CLINICAL HISTORY: LT HARDWARE REMOVAL    



COMPARISON STUDY:  Left elbow radiographs July 2, 2017.



Fluoroscopy time: 1.7 seconds.



FINDINGS: This single lateral fluoroscopic image demonstrates interval removal

of the hardware within the olecranon. Fracture appears healed. There are no

unexpected radiopaque foreign bodies.



IMPRESSION:  Fluoroscopic image demonstrating removal of  hardware.







Electronically signed by:  Jovani London M.D.

3/2/2018 9:37 AM



Dictated Date/Time:  3/2/2018 9:34 AM

## 2018-03-02 NOTE — DISCHARGE INSTRUCTIONS
Discharge Instructions


Date of Service


Mar 2, 2018.





Admission


Reason for Admission:  Left Elbow Presence Of Functional Joint -Unspecifi





Discharge


Discharge Diagnosis / Problem:  left elbow painful hardware





Discharge Goals


Goal(s):  Decrease discomfort, Improve function





Activity Recommendations


Activity Limitations:  per Instructions/Follow-up section





.





Instructions / Follow-Up


Instructions / Follow-Up


ACTIVITY RECOMMENDATIONS:





*  Avoid lifting anything heavier than a medium water glass until your first 

post operative visit.








SPECIAL CARE INSTRUCTIONS:





*   Your bandage should be left in place until 5 days after your surgery.


*   Some drainage onto the dressing may occur.  This is normal.


*   If the bandage feels excessively tight, you may loosen the elastic


    bandage.  Then call the physician's office for further instructions.


*   If possible, keep your hand elevated above the level of your


    heart for the first 2 post operative days.  You may use a sling if 

necessary.


*   You should move your fingers regularly ( motions per hour)


    unless otherwise instructed.








SPECIAL PRECAUTIONS:





*  If you notice increased drainage, fever over 101 degrees F. or severe,


    unremitting pain, call your physician/office at [phone].


*  You may have been prescribed pain medication.  If you experience nausea


    and/or skin rash, discontinue this medication and contact our office for an 


    alternative medication.








FOLLOW UP VISIT:





If appointment is not already scheduled:





Please call [facility] to make a follow-up appointment 


for 2 weeks after your surgery at [phone].





Current Hospital Diet


Patient's current hospital diet:





Discharge Diet


Recommended Diet:  Regular Diet





Procedures


Procedures Performed:  


1. Left elbow removal deep painful retained hardware.


2. MEAGAN  Left elbow contracture.





Pending Studies


Studies pending at discharge:  no





Medical Emergencies








.


Who to Call and When:





Medical Emergencies:  If at any time you feel your situation is an emergency, 

please call [phone] immediately.





.





Non-Emergent Contact


Non-Emergency issues call your:  Primary Care Provider


Call Non-Emergent contact if:  temperature is above 101, your pain is not 

controlled, your pain is worsening, wound has increased drainage, wound has 

increased redness, wound has increased pain


.








"Provider Documentation" section prepared by Agustín Lakhani.








.





VTE Core Measure


Inpt VTE Proph given/why not?:  SCD's

## 2018-03-02 NOTE — ANESTHESIOLOGY PROGRESS NOTE
Anesthesia Post Op Note


Date & Time


Mar 2, 2018 at 09:19





Vital Signs


Pain Intensity:  0





Vital Signs Past 12 Hours








  Date Time  Temp Pulse Resp B/P (MAP) Pulse Ox O2 Delivery O2 Flow Rate FiO2


 


3/2/18 09:05  59 15 119/61 95 Room Air  


 


3/2/18 08:55  72 14 127/85 96 Oxymask 10 


 


3/2/18 08:45  73 25 119/59 96 Oxymask 10 


 


3/2/18 08:39 36.4 60 16 87/43 98 Oxymask 10 


 


3/2/18 06:56 36.9 75 18 162/83 (109) 98 Room Air  











Notes


Mental Status:  alert / awake / arousable, participated in evaluation


Pt Amnestic to Procedure:  Yes


Nausea / Vomiting:  adequately controlled


Pain:  adequately controlled


Airway Patency, RR, SpO2:  stable & adequate


BP & HR:  stable & adequate


Hydration State:  stable & adequate


Anesthetic Complications:  no major complications apparent


Block working well in pacu

## 2020-03-04 NOTE — ANESTHESIOLOGY PROGRESS NOTE
Anesthesia Post Op Note


Date & Time


Jun 5, 2017 at 19:36





Vital Signs


Pain Intensity:  1





Vital Signs Past 12 Hours








  Date Time  Temp Pulse Resp B/P (MAP) Pulse Ox O2 Delivery O2 Flow Rate FiO2


 


6/5/17 19:15  86 18 173/85 94 Room Air  


 


6/5/17 19:05  90 18 179/88 99 Room Air  


 


6/5/17 18:55 36.9 110 18 186/91 96 Mask 10 


 


6/5/17 16:10 36.9 69 18 153/75 95 Room Air  


 


6/5/17 15:45      Room Air  


 


6/5/17 15:16 36.5 63 18 156/73 (100) 97 Room Air  


 


6/5/17 08:02 36.7 63 16 125/66 (85) 97 Room Air  


 


6/5/17 08:00      Room Air  











Notes


Mental Status:  alert / awake / arousable, participated in evaluation


Pt Amnestic to Procedure:  Yes


Nausea / Vomiting:  adequately controlled


Pain:  adequately controlled


Airway Patency, RR, SpO2:  stable & adequate


BP & HR:  stable & adequate


Hydration State:  stable & adequate


Anesthetic Complications:  no major complications apparent LR 100ml/hr, zofran, risperdal, protonix

## 2023-06-28 NOTE — DISCHARGE SUMMARY
Discharge Summary


Date of Service


Jun 7, 2017.





Discharge Summary


Admission Date:


Jun 4, 2017 at 16:17


Discharge Date:  Jun 7, 2017


Discharge Disposition:  Rehab


Principal Diagnosis:


 Left Elbow fracture S/P ORIF


Procedures:


Rib Series:


1. The lungs are clear and there is no pneumothorax.


2.There are acute and nondistracted left anterolateral 7th through 10th rib


fractures seen on the rib series.








R hip X ray:


1. There is no radiographic evidence of right hip fracture.


2. A right hip arthroplasty is in near-anatomic alignment.








Elbow X ray:


1. There is a distracted fracture of the olecranon process with associated joint


effusion and soft tissue edema.


2. No additional fracture is seen.








S/P ORIF LEFT Olecranon fx





Consultations:


Orthopedics


Pending Studies/Follow-Up:


Follow up with PCP  on 6/12/17 at 3:00 pm  


Follow up with Orthopedics  in 2 weeks





Medication Reconciliation


Continued Medications:  


Calcium Carbonate-Vitamin D (Calcium 600 + D) 1 Tab Tab


1 TAB PO BID





Carbamazepine (Carbatrol Er) 200 Mg Capcr


200 MG PO BID, CAP





Ergocalciferol (Drisdol) 50,000 Unit Cap


1 CAP PO MONTHLY





Fentanyl (Fentanyl) 12 Mcg Tdsy


12 MCG TOP Q3D for 9 Days, #3 (This prescription has been renewed)





Hydrocodone/Acetaminophen (Norco 10/325 Tab) 1 Tab Tab


1 TAB PO Q4H PRN for Pain for 3 Days, #12 TAB (This prescription has been 

renewed)





Losartan Potassium (Cozaar) 50 Mg Tab


1 TAB PO DAILY, 0 Refills





Multiple Vitamins W/ Minerals (Preservision Areds) 1 Cap Cap


2 CAP PO DAILY





Potassium Chloride (K-Tabs) 10 Meq Tabcr


10 MEQ PO DAILY





Tolterodine Tartrate (Tolterodine Tartrate) 2 Mg Tab


2 MG PO BID





Zonisamide (Zonegran) 100 Mg Cap


100 MG PO BID, 0 Refills











Admission Information


HPI (per Admitting provider):


84 yo F with OA presents with L elbow pain and swelling after rolling off of 

her couch onto her floor.  Workup in the ER reveals a L exbow fracture and 

several L rib fractures.  She reports some pain in those areas, but states that 

it is controlled.  She has had surgeries in the past with no problems 

tolerating anesthesia.  She denies a history of blood clot.  She denies any 

history of chest pain or shortness of breath in the past 6 months and denies 

any history of heart or lung disease.  At baseline, she is very functional as 

she lives alone.  She states she takes care of everything for herself and can 

climb a flight of steps without any issues.  She does has a h/o R hip 

replacement and reports some chronic pain in that joint for which she takes 

Fentanyl successfully.  She reports h/o epilepsy but denies any h/o seizure in 

years.  Dr Cormier was contacted by the ER staff and is aware of the patient.  

He asked for Medicine team to admit and he will see the patient in consult.


Physical Exam (per Admitting):


GEN: elderly, frail, in no acute distress, alert and appropriate


HEENT: NC/AT, PERRL, normal sclerae, MMM, pharynx non-acute, no LAD


CARDIO: reg rate, S1/2 heard without m/g/r


LUNGS: CTA bilaterally, no crackles, rales or wheezes, good diaphragmatic 

excursion


ABD: soft, non-tender, non-distended, no rebound or guarding, +BS


EXTREMITY: 2+ pulses throughout, L elbow is swollen and erythematous-localized 

over olecranon process, cannot fully extend, sensation intact in distal 

extremity, moves all fingers without issue. 


NEURO: CN 2-12 grossly intact, sensation intact throughout


MUSC: limited movement of L arm as above, some difficulty moving R leg because 

of R hip pain, otherwise strength appears 5/5 throughout. 


SKIN:  warm and dry and changes as above.





Hospital Course


Patient is an 85 yr F with osteoporosis presents with L elbow fracture and 

multiple L rib fractures after traumatic fall.





LEFT OLECRANON FRACTURE


S/P MECHANICAL FALL 


S/O L Olecranon ORIF POD #2 


CXR- Rib fractures 7-10, left and no other acute abnormalities


Pain control


Advised to keep splint in place at all times.


Use Sling as needed 


Needs follow up with Dr. Cormier in 2 weeks


Ortho signed off.








Left Rib fractures:


X ray- 7th-10th , left non displaced fractures


Conservative management 








H/O Epilepsy 


Stable


Continue current medications








HTN


Stable


Continue losartan





OSTEOPOROSIS


Vitamin D level 41- normal


Continue with calcium supplements





ABNORMAL UA


No urinary symptoms


Urine cx- contaminated


S/P IV Rocephin: discontinued








H/O R hip Arthroplasty  








Chronic Hyponatremia:


Stable


Monitor








DVT Px:


heparin SQ








Code Status:


DNR








DISPOSITION


Plan to [facility]


, PT/OT consulted





Follow up with PCP  on 6/12/17 at 3:00 pm  


Follow up with Orthopedics  in 2 weeks


Total time spent on discharge = 40 minutes


This includes examination of the patient, discharge planning, medication 

reconciliation, and communication with other providers.





Discharge Instructions


Discharge Instructions


Date of Service


Jun 7, 2017.





Admission


Reason for Admission:  Elbow Fracture, Left





Discharge


Discharge Diagnosis / Problem:  Left Elbow fracture S/P ORIF





Discharge Goals


Goal(s):  Decrease discomfort, Improve function





Activity Recommendations


Activity Limitations:  per Instructions/Follow-up section


Exercise/Sports Limitations:  as tolerated





.





Instructions / Follow-Up


Instructions / Follow-Up


Follow up with PCP  on 6/12/17 at 3:00 pm  


Follow up with Orthopedics  in 2 weeks





Current Hospital Diet


Patient's current hospital diet: Regular Diet





Discharge Diet


Recommended Diet:  Regular Diet





Procedures


Procedures Performed:  


Left open reduction internal fixation of olecranon fracture





Pending Studies


Studies pending at discharge:  no





Medical Emergencies








.


Who to Call and When:





Medical Emergencies:  If at any time you feel your situation is an emergency, 

please call [phone] immediately.





.





Non-Emergent Contact


Non-Emergency issues call your:  Primary Care Provider, Surgeon


Call Non-Emergent contact if:  you have a fever, your pain is not controlled, 

your pain is worsening, your pain is unusual for you, you have any medication 

questions





.


.








"Provider Documentation" section prepared by Cristopher Urbano.








.





Consultant Recommendations


Consultant Recommendations:


ACTIVITY RECOMMENDATIONS:





*  Avoid lifting anything with the left upper extremity until your first post 

operative visit.








SPECIAL CARE INSTRUCTIONS:





*   Your bandage should be left in place until your follow up visit in 2 weeks.


*   Some drainage onto the dressing may occur.  This is normal.


*   If the bandage feels excessively tight, you may loosen the elastic


    bandage.  Then call the physician's office for further instructions.


*   If possible, keep your hand elevated above the level of your


    heart for the first 2 post operative days.  You may use a sling if 

necessary.


*   You should move your fingers regularly ( motions per hour)


    unless otherwise instructed.








SPECIAL PRECAUTIONS:





*  If you notice increased drainage, fever over 101 degrees F. or severe,


    unremitting pain, call your physician/office at [phone].


*  You may have been prescribed pain medication.  If you experience nausea


    and/or skin rash, discontinue this medication and contact our office for an 


    alternative medication.








FOLLOW UP VISIT:





If appointment is not already scheduled:





Please call [facility] to make a follow-up appointment with 

Dr. Cormier 


for 2 weeks after your surgery at [phone].





VTE Core Measure


Inpt VTE Proph given/why not?:  Unfractionated heparin SQ
Previously Negative (within the last year)

## 2024-04-02 NOTE — DIAGNOSTIC IMAGING REPORT
H&P reviewed. The patient was examined and there are no changes to the H&P.         LEFT ELBOW 2 VIEWS



CLINICAL HISTORY: LT ORIF    



COMPARISON: 6/4/2017



DISCUSSION: Evidence for open reduction internal fixation of the proximal ulnar

fracture. Alignment appears anatomic. There is no evidence for soft tissue

swelling.



IMPRESSION: Anatomic alignment status post open reduction internal fixation







Electronically signed by:  Anthony Calderón M.D.

6/5/2017 6:54 PM



Dictated Date/Time:  6/5/2017 6:53 PM